# Patient Record
Sex: MALE | Race: WHITE | NOT HISPANIC OR LATINO | Employment: OTHER | URBAN - METROPOLITAN AREA
[De-identification: names, ages, dates, MRNs, and addresses within clinical notes are randomized per-mention and may not be internally consistent; named-entity substitution may affect disease eponyms.]

---

## 2018-01-16 RX ORDER — SIMVASTATIN 20 MG
20 TABLET ORAL
COMMUNITY

## 2018-01-16 RX ORDER — TAMSULOSIN HYDROCHLORIDE 0.4 MG/1
0.4 CAPSULE ORAL
COMMUNITY
End: 2018-11-15 | Stop reason: SDUPTHER

## 2018-01-16 RX ORDER — FINASTERIDE 5 MG/1
5 TABLET, FILM COATED ORAL DAILY
COMMUNITY
End: 2018-11-15 | Stop reason: SDUPTHER

## 2018-01-16 NOTE — PRE-PROCEDURE INSTRUCTIONS
Pre-Surgery Instructions:   Medication Instructions    finasteride (PROSCAR) 5 mg tablet Instructed patient per Anesthesia Guidelines   simvastatin (ZOCOR) 20 mg tablet Instructed patient per Anesthesia Guidelines   tamsulosin (FLOMAX) 0 4 mg Instructed patient per Anesthesia Guidelines  Pre op and bathing instructions given   Pt has hibiclens

## 2018-01-17 ENCOUNTER — ANESTHESIA EVENT (OUTPATIENT)
Dept: PERIOP | Facility: HOSPITAL | Age: 73
End: 2018-01-17
Payer: MEDICARE

## 2018-01-18 ENCOUNTER — ANESTHESIA (OUTPATIENT)
Dept: PERIOP | Facility: HOSPITAL | Age: 73
End: 2018-01-18
Payer: MEDICARE

## 2018-01-18 ENCOUNTER — HOSPITAL ENCOUNTER (OUTPATIENT)
Facility: HOSPITAL | Age: 73
Setting detail: OUTPATIENT SURGERY
Discharge: HOME | End: 2018-01-18
Attending: SURGERY | Admitting: SURGERY
Payer: MEDICARE

## 2018-01-18 VITALS
HEART RATE: 76 BPM | HEIGHT: 73 IN | DIASTOLIC BLOOD PRESSURE: 84 MMHG | TEMPERATURE: 97.7 F | RESPIRATION RATE: 16 BRPM | OXYGEN SATURATION: 96 % | SYSTOLIC BLOOD PRESSURE: 137 MMHG | WEIGHT: 180 LBS | BODY MASS INDEX: 23.86 KG/M2

## 2018-01-18 DIAGNOSIS — R33.8 URINARY RETENTION DUE TO BENIGN PROSTATIC HYPERPLASIA: Primary | ICD-10-CM

## 2018-01-18 DIAGNOSIS — N40.1 URINARY RETENTION DUE TO BENIGN PROSTATIC HYPERPLASIA: Primary | ICD-10-CM

## 2018-01-18 PROCEDURE — C1781 MESH (IMPLANTABLE): HCPCS | Performed by: SURGERY

## 2018-01-18 DEVICE — BARD MESH PERFIX PLUG, MEDIUM
Type: IMPLANTABLE DEVICE | Site: INGUINAL | Status: FUNCTIONAL
Brand: BARD MESH PERFIX PLUG

## 2018-01-18 DEVICE — BARD MODIFIED KUGEL HERNIA PATCH
Type: IMPLANTABLE DEVICE | Site: INGUINAL | Status: FUNCTIONAL
Brand: BARD MODIFIED KUGEL HERNIA PATCH

## 2018-01-18 RX ORDER — MEPERIDINE HYDROCHLORIDE 25 MG/ML
12.5 INJECTION INTRAMUSCULAR; INTRAVENOUS; SUBCUTANEOUS
Status: DISCONTINUED | OUTPATIENT
Start: 2018-01-18 | End: 2018-01-18 | Stop reason: HOSPADM

## 2018-01-18 RX ORDER — ONDANSETRON 2 MG/ML
INJECTION INTRAMUSCULAR; INTRAVENOUS AS NEEDED
Status: DISCONTINUED | OUTPATIENT
Start: 2018-01-18 | End: 2018-01-18 | Stop reason: SURG

## 2018-01-18 RX ORDER — LIDOCAINE HYDROCHLORIDE 10 MG/ML
INJECTION, SOLUTION INFILTRATION; PERINEURAL AS NEEDED
Status: DISCONTINUED | OUTPATIENT
Start: 2018-01-18 | End: 2018-01-18 | Stop reason: SURG

## 2018-01-18 RX ORDER — SODIUM CHLORIDE, SODIUM LACTATE, POTASSIUM CHLORIDE, CALCIUM CHLORIDE 600; 310; 30; 20 MG/100ML; MG/100ML; MG/100ML; MG/100ML
125 INJECTION, SOLUTION INTRAVENOUS CONTINUOUS
Status: DISCONTINUED | OUTPATIENT
Start: 2018-01-18 | End: 2018-01-18 | Stop reason: HOSPADM

## 2018-01-18 RX ORDER — MAGNESIUM HYDROXIDE 1200 MG/15ML
LIQUID ORAL AS NEEDED
Status: DISCONTINUED | OUTPATIENT
Start: 2018-01-18 | End: 2018-01-18 | Stop reason: HOSPADM

## 2018-01-18 RX ORDER — ONDANSETRON 2 MG/ML
4 INJECTION INTRAMUSCULAR; INTRAVENOUS EVERY 6 HOURS PRN
Status: DISCONTINUED | OUTPATIENT
Start: 2018-01-18 | End: 2018-01-18 | Stop reason: HOSPADM

## 2018-01-18 RX ORDER — SODIUM CHLORIDE 9 MG/ML
INJECTION, SOLUTION INTRAVENOUS CONTINUOUS PRN
Status: DISCONTINUED | OUTPATIENT
Start: 2018-01-18 | End: 2018-01-18 | Stop reason: SURG

## 2018-01-18 RX ORDER — METOCLOPRAMIDE HYDROCHLORIDE 5 MG/ML
5 INJECTION INTRAMUSCULAR; INTRAVENOUS ONCE AS NEEDED
Status: DISCONTINUED | OUTPATIENT
Start: 2018-01-18 | End: 2018-01-18 | Stop reason: HOSPADM

## 2018-01-18 RX ORDER — FENTANYL CITRATE/PF 50 MCG/ML
25 SYRINGE (ML) INJECTION
Status: DISCONTINUED | OUTPATIENT
Start: 2018-01-18 | End: 2018-01-18 | Stop reason: HOSPADM

## 2018-01-18 RX ORDER — ONDANSETRON 2 MG/ML
4 INJECTION INTRAMUSCULAR; INTRAVENOUS EVERY 4 HOURS PRN
Status: DISCONTINUED | OUTPATIENT
Start: 2018-01-18 | End: 2018-01-18 | Stop reason: HOSPADM

## 2018-01-18 RX ORDER — OXYCODONE HYDROCHLORIDE AND ACETAMINOPHEN 5; 325 MG/1; MG/1
2 TABLET ORAL EVERY 4 HOURS PRN
Status: DISCONTINUED | OUTPATIENT
Start: 2018-01-18 | End: 2018-01-18 | Stop reason: HOSPADM

## 2018-01-18 RX ORDER — MORPHINE SULFATE 4 MG/ML
4 INJECTION, SOLUTION INTRAMUSCULAR; INTRAVENOUS
Status: DISCONTINUED | OUTPATIENT
Start: 2018-01-18 | End: 2018-01-18 | Stop reason: HOSPADM

## 2018-01-18 RX ORDER — FENTANYL CITRATE 50 UG/ML
INJECTION, SOLUTION INTRAMUSCULAR; INTRAVENOUS AS NEEDED
Status: DISCONTINUED | OUTPATIENT
Start: 2018-01-18 | End: 2018-01-18 | Stop reason: SURG

## 2018-01-18 RX ORDER — PROPOFOL 10 MG/ML
INJECTION, EMULSION INTRAVENOUS AS NEEDED
Status: DISCONTINUED | OUTPATIENT
Start: 2018-01-18 | End: 2018-01-18 | Stop reason: SURG

## 2018-01-18 RX ORDER — SODIUM CHLORIDE 9 MG/ML
20 INJECTION, SOLUTION INTRAVENOUS CONTINUOUS
Status: DISCONTINUED | OUTPATIENT
Start: 2018-01-18 | End: 2018-01-18 | Stop reason: HOSPADM

## 2018-01-18 RX ORDER — BUPIVACAINE HYDROCHLORIDE AND EPINEPHRINE 2.5; 5 MG/ML; UG/ML
INJECTION, SOLUTION EPIDURAL; INFILTRATION; INTRACAUDAL; PERINEURAL AS NEEDED
Status: DISCONTINUED | OUTPATIENT
Start: 2018-01-18 | End: 2018-01-18 | Stop reason: HOSPADM

## 2018-01-18 RX ORDER — OXYCODONE HYDROCHLORIDE AND ACETAMINOPHEN 5; 325 MG/1; MG/1
2 TABLET ORAL EVERY 4 HOURS PRN
Qty: 28 TABLET | Refills: 0 | Status: SHIPPED | OUTPATIENT
Start: 2018-01-18

## 2018-01-18 RX ADMIN — CEFAZOLIN SODIUM 2000 MG: 2 SOLUTION INTRAVENOUS at 09:01

## 2018-01-18 RX ADMIN — FENTANYL CITRATE 25 MCG: 50 INJECTION INTRAMUSCULAR; INTRAVENOUS at 09:25

## 2018-01-18 RX ADMIN — DEXAMETHASONE SODIUM PHOSPHATE 10 MG: 10 INJECTION INTRAMUSCULAR; INTRAVENOUS at 09:03

## 2018-01-18 RX ADMIN — PROPOFOL 150 MG: 10 INJECTION, EMULSION INTRAVENOUS at 08:58

## 2018-01-18 RX ADMIN — SODIUM CHLORIDE: 0.9 INJECTION, SOLUTION INTRAVENOUS at 08:31

## 2018-01-18 RX ADMIN — LIDOCAINE HYDROCHLORIDE 50 MG: 10 INJECTION, SOLUTION INFILTRATION; PERINEURAL at 08:58

## 2018-01-18 RX ADMIN — FENTANYL CITRATE 25 MCG: 50 INJECTION INTRAMUSCULAR; INTRAVENOUS at 09:09

## 2018-01-18 RX ADMIN — ONDANSETRON 4 MG: 2 INJECTION INTRAMUSCULAR; INTRAVENOUS at 09:34

## 2018-01-18 NOTE — OP NOTE
OPERATIVE REPORT  PATIENT NAME: Tata Chacon    :  1945  MRN: [id number]  Pt Location: [facility] 02    SURGERY DATE: 2018    Surgeon(s) and Role:     * Jm Tse DO - Primary     * Deacon Roth MD - Assisting    Preop Diagnosis:  Inguinal hernia [K40 90]    Post-Op Diagnosis Codes:     * Inguinal hernia [K40 90]    Procedure:  Repair of right inguinal hernia with 4 inch modified Kugel mesh as well as medium-sized PerFix plug      Specimen(s):  * No specimens in log *    Estimated Blood Loss:   Minimal    Drains:       Anesthesia Type:   General    Operative Indications:  Inguinal hernia [K40 90]      Operative Findings:  Large indirect inguinal hernia repaired with a 4 inch modified Kugel mesh     He had a small focal direct inguinal hernia corrected with a medium plug  A single onlay mesh from the Kugel composite systems covered both areas    Review of Systems/Medical History      Cardiovascular  Hyperlipidemia,  Pulmonary   GI/Hepatic    Endo/Other GYN   Hematology Musculoskeletal   Neurology Psychology         Height 73 inches weight 82 kg/180 lb BMI 24    ASA 2, wound class 1    Due to history of urinary retention for other procedures and known BPH, a Beltre catheter was inserted for the case  He will go home with this  Complications:   None    Procedure and Technique:  Patient was brought the operative suite and identified by visualization, conversation, by armband  Sequential compression pumps were placed  He was given perioperative antibiotics  Once under anesthesia, a Beltre catheter was inserted under sterile technique  Lower abdomen and right groin were then prepped and draped in a sterile fashion  Time-out was performed and was assured that the prep was dry    Local was instilled over the right inguinal canal   Oblique skin incision was made in the subcutaneous tissues were divided with hot cautery down to the external oblique fascia this fascia was opened up through the external ring to the level of the internal ring  Pablo retractor was placed for exposure  Ilioinguinal nerve ran across the surrounding cord and therefore was sacrificed in the typical fashion with Metzenbaum scissors  Cremasteric fibers were then skeletonized off the cord structures  Was a fair amount of scarring noted of the cremasteric 6 on top of the cord at the level of the external ring  Cord structures were then encircled with a Penrose drain  Rather large indirect hernia sac was noted and this was dissected out in a high fashion preserving all cord structures  This was reduced into the internal ring defect  Preperitoneal space was developed  A 4 inch modified Kugel mesh was chosen  Round preperitoneal aspect was placed into the indirect defect  The tails were anchored each side the defect with 2 0 Vicryl suture  Tails were trimmed  I then looked inferiorly towards the pubic tubercle all to place the onlay mesh  It was a small focal direct inguinal hernia just at the pubic tubercle  Transversalis tissues were scored and the preperitoneal space was developed with a 4 x 4 sponge  A medium plug was placed into this defect  In the leads were anchored each side of the opening with 2 0 Vicryl suture  Onlay mesh from the modified cool mesh was then placed on the floor of the canal this covered both defects nicely  Two Vicryl was used anchored to the pubic tubercle shelving edge and conjoined tendon  I cut from the superior aspect of the mesh down to the cord structures  The resultant 2 tails were then brought around the cord and anchored to themselves as well as the underlying transversalis tissues creating new internal ring  The tails were trimmed slightly and then the remainder tucked under the external oblique fascia  Irrigation is carried out more local was instilled external oblique fascia was reapproximated on top of the cord with a running 2 0 Vicryl suture    Irrigation was carried out yet again  Familia was reapproximated in 2 0 Vicryl  Skin was closing 4 Monocryl in a subcuticular fashion  Wounds washed and dried  Sterile histoacryl was applied  Was assured that the testicles in the scrotum at the completion the case  He was awakened in the operating room and returned to the recovery area in stable condition   Beltre catheter remained in place to was history of urinary retention   I was present for the entire procedure    Patient Disposition:  PACU     SIGNATURE: Jm Tse DO  DATE: January 18, 2018  TIME: 9:54 AM

## 2018-01-18 NOTE — ANESTHESIA PREPROCEDURE EVALUATION
Review of Systems/Medical History          Cardiovascular  Hyperlipidemia,    Pulmonary       GI/Hepatic            Endo/Other     GYN       Hematology   Musculoskeletal       Neurology   Psychology           Physical Exam    Airway    Mallampati score: II         Dental   No notable dental hx     Cardiovascular      Pulmonary      Other Findings        Anesthesia Plan  ASA Score- 2     Anesthesia Type- general with ASA Monitors  Additional Monitors:   Airway Plan: LMA  Comment: I, Dr Kendra Adames, the attending physician, have personally seen and evaluated the patient prior to anesthetic care  I have reviewed the pre-anesthetic record, and other medical records if appropriate to the anesthetic care  If a CRNA is involved in the case, I have reviewed the CRNA assessment, if present, and agree  The patient is in a suitable condition to proceed with my formulated anesthetic plan        Plan Factors-    Induction- intravenous  Postoperative Plan-     Informed Consent- Anesthetic plan and risks discussed with patient  I personally reviewed this patient with the CRNA  Discussed and agreed on the Anesthesia Plan with the CRNA  Martín Stafford

## 2018-01-18 NOTE — DISCHARGE INSTRUCTIONS
Please call the office when you leave to schedule an appointment to be seen in 2-3 weeks    Activity:    Do not lift more than 10 pounds (a gallon of milk) for 1-2 weeks post-operatively                 Walking is encouraged  Normal daily activities including climbing steps are okay  Do not engage in strenuous activity or contact sports for 4-6 weeks post-operatively    Return to work:   Return to work to be discussed at first post-operative visit    Diet:   You may return to your normal heart healthy diet    Wound Care: Your wound is closed with histoacryl  It is okay to shower  Wash incision gently with soap and water and pat dry  Do not soak incisions in bath water or swim for two weeks  Do not apply any creams or ointments  May apply ice to wound    Pain Medication:   Please take as directed  No driving while taking narcotic pain medications    Other:  If you have questions after discharge please call the office  Beltre catheter care    Can removed on Monday 01/22/2018    If you have increased pain, fever >101 5, increased drainage, redness or a bad smell at your surgery site, please call us immediately or come directly to the Emergency Room

## 2018-01-18 NOTE — SOCIAL WORK
CM  was consulted by same day surgery to set up VNA for Pt to have Beltre cath  discontinued  CM spoke with Neon Felix from [facility] ([phone]) to set up VNA for pt once d/c    CM informed Nathan Meredith from Same day surgery to fax over needed information to ([phone])  VNA will follow up Saturday  NO other needs

## 2018-01-18 NOTE — ANESTHESIA POSTPROCEDURE EVALUATION
Post-Op Assessment Note      CV Status:  Stable    Mental Status:  Alert and lethargic    Hydration Status:  Euvolemic    PONV Controlled:  Controlled    Airway Patency:  Patent    Post Op Vitals Reviewed: Yes          Staff: CRNA           BP (P) 130/68 (01/18/18 1007)    Temp (P) 97 5 °F (36 4 °C) (01/18/18 1007)    Pulse (P) 74 (01/18/18 1007)   Resp (P) 14 (01/18/18 1007)    SpO2

## 2018-03-13 ENCOUNTER — TELEPHONE (OUTPATIENT)
Dept: UROLOGY | Facility: CLINIC | Age: 73
End: 2018-03-13

## 2018-03-13 NOTE — TELEPHONE ENCOUNTER
Appt set up for 3/19 in [place] off with Dr Liu Breeding   Patient will bring records from previous Urologist

## 2018-03-19 ENCOUNTER — OFFICE VISIT (OUTPATIENT)
Dept: UROLOGY | Facility: CLINIC | Age: 73
End: 2018-03-19
Payer: MEDICARE

## 2018-03-19 VITALS
DIASTOLIC BLOOD PRESSURE: 80 MMHG | WEIGHT: 179 LBS | SYSTOLIC BLOOD PRESSURE: 142 MMHG | HEART RATE: 96 BPM | HEIGHT: 72 IN | BODY MASS INDEX: 24.24 KG/M2

## 2018-03-19 DIAGNOSIS — N40.2 PROSTATE NODULE: ICD-10-CM

## 2018-03-19 DIAGNOSIS — N40.1 BENIGN PROSTATIC HYPERPLASIA WITH LOWER URINARY TRACT SYMPTOMS, SYMPTOM DETAILS UNSPECIFIED: Primary | ICD-10-CM

## 2018-03-19 DIAGNOSIS — N30.00 ACUTE CYSTITIS WITHOUT HEMATURIA: ICD-10-CM

## 2018-03-19 DIAGNOSIS — R97.20 ELEVATED PSA: ICD-10-CM

## 2018-03-19 LAB
BACTERIA UR QL AUTO: ABNORMAL /HPF
BILIRUB UR QL STRIP: NEGATIVE
CLARITY UR: CLEAR
COLOR UR: YELLOW
GLUCOSE UR STRIP-MCNC: NEGATIVE MG/DL
HGB UR QL STRIP.AUTO: ABNORMAL
HYALINE CASTS #/AREA URNS LPF: ABNORMAL /LPF
KETONES UR STRIP-MCNC: NEGATIVE MG/DL
LEUKOCYTE ESTERASE UR QL STRIP: ABNORMAL
NITRITE UR QL STRIP: POSITIVE
NON-SQ EPI CELLS URNS QL MICRO: ABNORMAL /HPF
PH UR STRIP.AUTO: 7 [PH] (ref 4.5–8)
PROT UR STRIP-MCNC: ABNORMAL MG/DL
RBC #/AREA URNS AUTO: ABNORMAL /HPF
SL AMB  POCT GLUCOSE, UA: ABNORMAL
SL AMB LEUKOCYTE ESTERASE,UA: ABNORMAL
SL AMB POCT BLOOD,UA: ABNORMAL
SL AMB POCT CLARITY,UA: ABNORMAL
SL AMB POCT COLOR,UA: YELLOW
SL AMB POCT KETONES,UA: ABNORMAL
SL AMB POCT NITRITE,UA: ABNORMAL
SL AMB POCT PH,UA: 5
SL AMB POCT SPECIFIC GRAVITY,UA: 1.02
SL AMB POCT URINE PROTEIN: ABNORMAL
SL AMB POCT UROBILINOGEN: ABNORMAL
SP GR UR STRIP.AUTO: 1.02 (ref 1–1.03)
UROBILINOGEN UR QL STRIP.AUTO: 0.2 E.U./DL
WBC #/AREA URNS AUTO: ABNORMAL /HPF

## 2018-03-19 PROCEDURE — 87086 URINE CULTURE/COLONY COUNT: CPT | Performed by: UROLOGY

## 2018-03-19 PROCEDURE — 81001 URINALYSIS AUTO W/SCOPE: CPT | Performed by: UROLOGY

## 2018-03-19 PROCEDURE — 99204 OFFICE O/P NEW MOD 45 MIN: CPT | Performed by: UROLOGY

## 2018-03-19 PROCEDURE — 87147 CULTURE TYPE IMMUNOLOGIC: CPT | Performed by: UROLOGY

## 2018-03-19 PROCEDURE — 81002 URINALYSIS NONAUTO W/O SCOPE: CPT | Performed by: UROLOGY

## 2018-03-19 PROCEDURE — 87186 SC STD MICRODIL/AGAR DIL: CPT | Performed by: UROLOGY

## 2018-03-19 PROCEDURE — 51798 US URINE CAPACITY MEASURE: CPT | Performed by: UROLOGY

## 2018-03-19 NOTE — PROGRESS NOTES
UROLOGY NEW CONSULT NOTE     History of Present Illness:   Laith Bearden is a 71 y o  male with a complaint of BPH/elevated PSA  Patient was previously under the care of a outside urologist   He is seen them for many years  His BPH has been managed with Flomax and Proscar  Patient had a recent cystourethroscopy in October of 2015  This demonstrated bilobar hypertrophy of the prostate with some trabeculation of the bladder  The patient has had urinary retention in the past     The patient has had markedly elevated PSAs  In 2016, the patient's PSA was as high as 16 2  Now on Proscar the patient's PSA is 9 12 measured in January of 2018  Patient has had 2 biopsies in 2012 and 2013  His prostate was measured at 66 g  He recently reports a urinary infection which was treated with antibiotics  He presents where it with his wife for discussion  The patient does have a hearing deficit  His daughter is a cardiologist with the [employer]  AUA 14 QoL1    Past Medical History:     Past Medical History:   Diagnosis Date    Hyperlipidemia        PAST SURGICAL HISTORY:     Past Surgical History:   Procedure Laterality Date    NO PAST SURGERIES      MD REPAIR ING HERNIA,5+Y/O,REDUCIBL Right 1/18/2018    Procedure: INGUINAL HERNIA REPAIR; VELÁSQUEZ CATHETER INSERTION;  Surgeon: Ruddy Coulter DO;  Location: [facility];  Service: General       CURRENT MEDICATIONS:     Current Outpatient Prescriptions   Medication Sig Dispense Refill    finasteride (PROSCAR) 5 mg tablet Take 5 mg by mouth daily      oxyCODONE-acetaminophen (PERCOCET) 5-325 mg per tablet Take 2 tablets by mouth every 4 (four) hours as needed for moderate pain for up to 20 doses Max Daily Amount: 12 tablets 28 tablet 0    simvastatin (ZOCOR) 20 mg tablet Take 20 mg by mouth daily at bedtime      tamsulosin (FLOMAX) 0 4 mg Take 0 4 mg by mouth daily with dinner       No current facility-administered medications for this visit  ALLERGIES:   No Known Allergies    SOCIAL HISTORY:     Social History     Social History    Marital status: /Civil Union     Spouse name: N/A    Number of children: N/A    Years of education: N/A     Social History Main Topics    Smoking status: Never Smoker    Smokeless tobacco: Never Used    Alcohol use Yes      Comment: occasionally    Drug use: No    Sexual activity: Not on file     Other Topics Concern    Not on file     Social History Narrative    No narrative on file       SOCIAL HISTORY:   No family history on file  REVIEW OF SYSTEMS:     General: negative for chills, fatigue, fever, significant unplanned weight changed  Psychological: negative for anxiety, depression, concentration or memory difficulties, irritability, mood swings, sleep disturbances  Ophthalmic: negative for blurry vision or double  ENT: negative for hearing difficulties, tinnitus, vertigo  Hematological and Lymphatic: negative for bleeding problems, blood clots, bruising, swollen lymph nodes  Respiratory: negative for shortness of breath, cough, hemoptysis, orthopnea, tachypnea or wheezing  Cardiovascular: negative for chest pain, dyspnea on exertion, edema, irregular or rapid heartbeat, paroxysmal nocturnal dyspnea  Gastrointestinal: negative for abdominal pain, bright red blood in stools, change in stools, constipation, diarrhea, nausea/vomiting, stool incontinence    GENITOURINARY: see HPI    Musculoskeletal: negative for gait disturbance, joint pain, joint stiffness/sweeling/pain, muscular weakness  Dermatological: negative for rash or skin lesion changes  Neurological: negative for confusion, dizziness, headaches, memory loss, numbness/tingling, seizures, speech problems, tremors or weakness    PHYSICAL EXAM:     There were no vitals taken for this visit  General:  Healthy appearing individual in no acute distress  They have a normal affect    There is not appear to be any gross neurologic defects or abnormalities  Hearing deficit  HEENT:  Normocephalic, atraumatic  Neck is supple without any palpable lymphadenopathy  Cardiovascular:  Patient has normal palpable distal radial pulses  There is no significant peripheral edema  No JVD is noted  Respiratory:  Patient has unlabored respirations  There is no audible wheeze or rhonchi  Abdomen:  Abdomen is without surgical scars  Abdomen is soft and nontender  There is no tympany  Inguinal and umbilical hernia are not appreciated  Genitourinary: no penile lesions or discharge, no testicular masses or tenderness, no hernias  YULIA with 1-1 5cm RIGHT base indurated nodule    Musculoskeletal:  Patient does not have significant CVA tenderness with palpation or percussion  They full range of motion in all 4 extremities  Strength in all 4 extremities appears congruent  Patient is able to ambulate without assistance or difficulty  Dermatologic:  Patient has no skin abnormalities or rashes  LABS:     CBC: No results found for: WBC, HGB, HCT, MCV, PLT    BMP: No results found for: GLUCOSE, CALCIUM, NA, K, CO2, CL, BUN, CREATININE    IMAGING:     No  imaging    PATHOLOGY:     Reported ASAP on biopsy in 2012; negative biopsy in 2013    PROCEDURE:     PVR 0cc    ASSESSMENT:     71 y o  male with markedly elevated PSA on Proscar, prostate nodule, and 2 prior negative biopsies  PLAN:     The patient is stable from a urinary perspective in terms of his symptoms  He is emptying his bladder well today on postvoid residual and has a low AUA symptom score  We discussed that if his symptoms worsen, we could consider doubling his Flomax but at this point time the patient is stable and comfortable  I have discussed at length the findings of the prostate nodule on his exam as well as the PSA of 9 which we have to double given his use of Proscar medication  His last biopsy was approximately 5 years ago and there is a report of a prior atypical biopsy    I do have some level of concern that there is an occult prostate cancer present  I have recommended a multiparametric MRI of the patient's pelvis and prostate to evaluate for a lesion of concern  I have discussed with the patient and his wife and his daughter by phone the likelihood of needing a 3rd prostate biopsy pending the results of the MRI  Given the patient's age and general health, I think it is reasonable if he had an intermediate or high risk disease to recommend active treatment  Patient and his family are comfortable with the plan moving forward  They will obtain the MRI and I will plan to see them back in short order  They will call me with any questions or concerns in the interim  Patient will continue on daily Flomax and Proscar for the time being

## 2018-03-19 NOTE — PATIENT INSTRUCTIONS
Decision Aid for Clinically Localized Prostate Cancer   AMBULATORY CARE:   What you need to know about decisions for clinically localized prostate cancer: You can work with your healthcare provider to make decisions about being screened or treated for prostate cancer  Screening is a test done to find prostate cancer early  Screening is different from diagnosis because screening is used before you first start to have signs or symptoms  This means management or treatment can start early  You can also help plan treatment if cancer is found with screening, or you develop it later on  What you need to know about clinically localized prostate cancer:   · The prostate is a small gland that is part of the reproductive system  It sits around your urethra (tube that carries urine out of your bladder)  Cancer cells start to grow inside the prostate gland  The cells form a mass that continues to grow if left untreated  Clinically localized means that the cancer has not moved beyond the prostate gland  · Prostate cancer is the second most common form of cancer in men (skin cancer is most common)  About 17% of men in the [place] develop prostate cancer  About 3% of men in the [place] die from prostate cancer  · Prostate cancer often grows slowly  Sometimes the tumor does not grow at all  The cancer may not grow quickly enough to be life-threatening in your lifetime  · The risk for prostate cancer increases with age  Your risk is highest if you are older than 65 years  Your risk is lowest if you are younger than 45 years  Your risk is also increased if you have a history of prostate cancer in your father, brother, or son  · You may have no signs or symptoms of prostate cancer until the tumor grows large  You may have trouble urinating or keeping a strong urine stream because of the tumor  At later stages, prostate cancer can spread to your bones or lymph nodes  You may have bone pain or fractures    How to know if you are a good candidate for prostate cancer screening:  Screening may be helpful for you if any of the following is true:  · You are 72 years or older  · You have a family history of prostate cancer or other prostate problems  · You do not have another health condition that may prevent you from living longer than another 10 years  · You want to have treatment as early as possible if needed  · You are willing to have screening as often as recommended by your healthcare provider  How screening is done:   · A digital rectal exam  is used to check your prostate  Your healthcare provider will insert a gloved finger into your rectum  The provider will be able to feel your prostate for lumps or hard areas that could be tumors  The exam may be repeated over time to check for new or worsening problems  · A PSA test  is used to measure the amount of a protein made by your prostate gland  A blood sample is taken for this test  A high PSA level may be a sign of prostate cancer  Benefits and risks of screening:  Talk with your healthcare provider about the risks and benefits of screening:  · Benefits  include finding prostate cancer early  Cancer treatment is often more successful when it starts early  This means you can make more decisions about treatment  · Risks  include the following:     ¨ You may have a false belief that you will not develop prostate cancer if your screening result is negative  You can still develop prostate cancer later on  ¨ A PSA test can give a false-negative result  This means the result looks like you do not have cancer even though you do  Treatment or monitoring may be delayed because the tests suggested you do not have cancer  ¨ The PSA test can also give a false-positive result  This means you do not actually have cancer but the test shows you do  You may get more tests, a biopsy, or even treatments that are not needed   It can also be stressful to think you have prostate cancer when you do not  Questions to ask your healthcare provider to help you make decisions about screening:   · How high is my risk for prostate cancer? · How often do I need to have screening? · Where is the screening done? · Do I need to do anything to get ready to have screening? What happens after you have screening:   · You will meet with your healthcare provider to go over the results of your screening  · You may need more tests to diagnose anything that showed up on the screening test  Only a biopsy (tissue sample) can show for sure that you have prostate cancer  · After cancer is found, your healthcare provider will assign a number called a Suzanne score  The number can help you understand how quickly the cancer is likely to grow, and if it may spread:     ¨ A number of 6 or lower means the cancer is likely to grow more slowly  ¨ A score of 7 means it is likely to grow faster, but it may not spread to other areas  ¨ A score of 8 to 10 means it is likely to grow more quickly and also spread  · Your healthcare provider will also assign a T stage to the tumor  This number shows the growth of the tumor and if it is likely to spread to other areas  · You and your healthcare provider will use the Suzanne score, T stage, and PSA results to talk about your treatment options  Your provider will tell you if your prostate cancer is at low, medium, or high risk for growing and spreading  The need for more tests and the range of treatment options depend on the risk level  Together you and your provider can create a treatment plan that is right for you  How clinically localized prostate cancer is treated, and the benefits of treatment:   · Watchful waiting  means you do not receive treatment right away  If the cancer does not grow or spread, you may never need treatment  Watchful waiting may be used if your tumor risk is low   The benefit of this option is that you will not have invasive or difficult treatment  You can choose a different treatment option if tests show the tumor is growing or spreading over time  · Active surveillance  also means you do not receive treatment right away  You will need to have tests over time to continue to check the tumor  A benefit of this option is that follow-up tests can show a change early  Treatment options may be less invasive than if the tumor is found at a later stage  · Cryoablation  is used to kill cancer cells by freezing them  This is a less invasive treatment  You may have little or no pain after this procedure  · Radiation  may be used to destroy the cancer cells  Radiation is about as effective as surgery to remove the prostate gland  This treatment leaves the prostate in place and only targets the cancer cells  · Surgery  is used to remove the prostate gland  The tumor is in the gland, so it will be removed along with the gland  · Hormone therapy  may be added to surgery or radiation treatment  Your testosterone level is lowered, or it is blocked  This can stop the cancer cells from growing, or slow the growth  Hormone therapy may be given as an injection every 1 to 6 months  Another way to lower your testosterone level is to have surgery to remove your testicles  Your body will no longer make testosterone if your testicles are removed  Risks of treatment:   · Watchful waiting and active surveillance  can cause you to worry that the cancer is growing or spreading  · Surgery  can damage tissue around your prostate  Surgery can increase your risk for trouble urinating, incontinence (leaking), or urinary retention  Surgery can also cause problems with your ability to have or keep an erection  You may bleed more than expected during surgery or develop an infection  You may also need to be treated again if the surgery you have does not relieve your symptoms  Surgery may not be able to remove all the tumor cells      · Radiation  may not kill all the cancer cells  Radiation can increase your risk for trouble urinating, incontinence (leaking), or urinary retention  You may have temporary or permanent hair loss in your scrotum  Your skin can become dry or irritated  You may develop problems urinating or having a bowel movement  Radiation can also cause problems with your ability to have or keep an erection  · Cryoablation  may not kill all the cancer cells  You may develop scar tissue  You can also have swelling, problems urinating, or pain in your pelvis or scrotum  Tissue outside the prostate may be damaged during cryoablation  You may have permanent erection problems  Questions to ask your healthcare provider to help you make decisions about treatment:   · What is my Byfield score, T score, and risk number? · How often will I need follow-up tests if I choose active surveillance first?    · How will each treatment option affect my daily activities? · What is the risk for erectile dysfunction or impotence with each treatment? · Am I a good candidate for surgery? · Which surgery may work best to treat my symptoms? · Where is the surgery done? · How long is recovery after surgery? · Will my insurance cover treatment? Questions to consider to help you make decisions about treatment:   · If my cancer risk is low, will I be comfortable with watchful waiting or active surveillance instead of immediate treatment? How will I feel if the cancer grows or spreads? · Will I go in for follow-up tests over time if I do not want treatment right away? · If I have treatment and lose my ability to have an erection, how will I feel? · Am I willing to accept problems with urinating or having a bowel movement that might happen with treatment? · How will my family or others in my life be affected by my treatment decisions?   © 2017 [employer] Information is for End User's use only and may not be sold, redistributed or otherwise used for commercial purposes  All illustrations and images included in CareNotes® are the copyrighted property of A [employer] M [employer] Inc  or [employer]  The above information is an  only  It is not intended as medical advice for individual conditions or treatments  Talk to your doctor, nurse or pharmacist before following any medical regimen to see if it is safe and effective for you

## 2018-03-21 LAB — BACTERIA UR CULT: ABNORMAL

## 2018-03-22 RX ORDER — SULFAMETHOXAZOLE AND TRIMETHOPRIM 800; 160 MG/1; MG/1
1 TABLET ORAL EVERY 12 HOURS SCHEDULED
Qty: 10 TABLET | Refills: 0 | Status: SHIPPED | OUTPATIENT
Start: 2018-03-22 | End: 2018-03-27

## 2018-04-05 ENCOUNTER — APPOINTMENT (OUTPATIENT)
Dept: LAB | Facility: HOSPITAL | Age: 73
End: 2018-04-05
Attending: UROLOGY
Payer: MEDICARE

## 2018-04-05 LAB
ANION GAP SERPL CALCULATED.3IONS-SCNC: 7 MMOL/L (ref 4–13)
BUN SERPL-MCNC: 16 MG/DL (ref 5–25)
CALCIUM SERPL-MCNC: 9.7 MG/DL (ref 8.3–10.1)
CHLORIDE SERPL-SCNC: 106 MMOL/L (ref 100–108)
CO2 SERPL-SCNC: 30 MMOL/L (ref 21–32)
CREAT SERPL-MCNC: 0.93 MG/DL (ref 0.6–1.3)
GFR SERPL CREATININE-BSD FRML MDRD: 82 ML/MIN/1.73SQ M
GLUCOSE SERPL-MCNC: 103 MG/DL (ref 65–140)
POTASSIUM SERPL-SCNC: 4.2 MMOL/L (ref 3.5–5.3)
SODIUM SERPL-SCNC: 143 MMOL/L (ref 136–145)

## 2018-04-05 PROCEDURE — 36415 COLL VENOUS BLD VENIPUNCTURE: CPT | Performed by: UROLOGY

## 2018-04-05 PROCEDURE — 80048 BASIC METABOLIC PNL TOTAL CA: CPT | Performed by: UROLOGY

## 2018-04-09 ENCOUNTER — HOSPITAL ENCOUNTER (OUTPATIENT)
Dept: MRI IMAGING | Facility: HOSPITAL | Age: 73
Discharge: HOME | End: 2018-04-09
Attending: UROLOGY
Payer: MEDICARE

## 2018-04-09 DIAGNOSIS — R97.20 ELEVATED PSA: ICD-10-CM

## 2018-04-09 DIAGNOSIS — N40.2 PROSTATE NODULE: ICD-10-CM

## 2018-04-09 DIAGNOSIS — N40.1 BENIGN PROSTATIC HYPERPLASIA WITH LOWER URINARY TRACT SYMPTOMS, SYMPTOM DETAILS UNSPECIFIED: ICD-10-CM

## 2018-04-09 PROCEDURE — A9577 INJ MULTIHANCE: HCPCS | Performed by: UROLOGY

## 2018-04-09 PROCEDURE — 76377 3D RENDER W/INTRP POSTPROCES: CPT

## 2018-04-09 PROCEDURE — 72197 MRI PELVIS W/O & W/DYE: CPT

## 2018-04-09 RX ADMIN — GADOBENATE DIMEGLUMINE 14 ML: 529 INJECTION, SOLUTION INTRAVENOUS at 13:58

## 2018-05-01 ENCOUNTER — OFFICE VISIT (OUTPATIENT)
Dept: UROLOGY | Facility: CLINIC | Age: 73
End: 2018-05-01
Payer: MEDICARE

## 2018-05-01 VITALS
SYSTOLIC BLOOD PRESSURE: 150 MMHG | DIASTOLIC BLOOD PRESSURE: 80 MMHG | BODY MASS INDEX: 24.11 KG/M2 | HEIGHT: 72 IN | WEIGHT: 178 LBS | HEART RATE: 96 BPM | RESPIRATION RATE: 20 BRPM

## 2018-05-01 DIAGNOSIS — N40.2 PROSTATE NODULE: Primary | ICD-10-CM

## 2018-05-01 DIAGNOSIS — R97.20 ELEVATED PSA: ICD-10-CM

## 2018-05-01 LAB
SL AMB  POCT GLUCOSE, UA: ABNORMAL
SL AMB LEUKOCYTE ESTERASE,UA: ABNORMAL
SL AMB POCT BILIRUBIN,UA: ABNORMAL
SL AMB POCT BLOOD,UA: ABNORMAL
SL AMB POCT CLARITY,UA: CLEAR
SL AMB POCT COLOR,UA: YELLOW
SL AMB POCT KETONES,UA: ABNORMAL
SL AMB POCT NITRITE,UA: ABNORMAL
SL AMB POCT PH,UA: 6
SL AMB POCT SPECIFIC GRAVITY,UA: 1.02
SL AMB POCT URINE PROTEIN: ABNORMAL
SL AMB POCT UROBILINOGEN: ABNORMAL

## 2018-05-01 PROCEDURE — 99213 OFFICE O/P EST LOW 20 MIN: CPT | Performed by: UROLOGY

## 2018-05-01 PROCEDURE — 81002 URINALYSIS NONAUTO W/O SCOPE: CPT | Performed by: UROLOGY

## 2018-05-01 NOTE — PROGRESS NOTES
UROLOGY ROUTINE FOLLOW UP NOTE     History of Present Illness:   Federico Weiss is a 71 y o  male with a complaint of BPH/elevated PSA  Patient was previously under the care of a outside urologist   He is seen them for many years  His BPH has been managed with Flomax and Proscar  Patient had a recent cystourethroscopy in October of 2015  This demonstrated bilobar hypertrophy of the prostate with some trabeculation of the bladder  The patient has had urinary retention in the past     The patient has had markedly elevated PSAs  In 2016, the patient's PSA was as high as 16 2  Now on Proscar the patient's PSA is 9 12 measured in January of 2018  Patient has had 2 biopsies in 2012 and 2013  His prostate was measured at 66 g  He recently reports a urinary infection which was treated with antibiotics  He presents where it with his wife for discussion  The patient does have a hearing deficit  His daughter is a cardiologist with the [employer] system      AUA 14 QoL1    Returns to discuss his options and results of mpMRI    Past Medical History:     Past Medical History:   Diagnosis Date    BPH with obstruction/lower urinary tract symptoms     Elevated PSA     Hyperlipidemia        PAST SURGICAL HISTORY:     Past Surgical History:   Procedure Laterality Date    HERNIA REPAIR      NO PAST SURGERIES      KS REPAIR ING HERNIA,5+Y/O,REDUCIBL Right 1/18/2018    Procedure: INGUINAL HERNIA REPAIR; VELÁSQUEZ CATHETER INSERTION;  Surgeon: Yajaira Fritz DO;  Location: [facility];  Service: General       CURRENT MEDICATIONS:     Current Outpatient Prescriptions   Medication Sig Dispense Refill    finasteride (PROSCAR) 5 mg tablet Take 5 mg by mouth daily      simvastatin (ZOCOR) 20 mg tablet Take 20 mg by mouth daily at bedtime      tamsulosin (FLOMAX) 0 4 mg Take 0 4 mg by mouth daily with dinner      oxyCODONE-acetaminophen (PERCOCET) 5-325 mg per tablet Take 2 tablets by mouth every 4 (four) hours as needed for moderate pain for up to 20 doses Max Daily Amount: 12 tablets 28 tablet 0     No current facility-administered medications for this visit          ALLERGIES:   No Known Allergies    SOCIAL HISTORY:     Social History     Social History    Marital status: /Civil Union     Spouse name: N/A    Number of children: N/A    Years of education: N/A     Social History Main Topics    Smoking status: Never Smoker    Smokeless tobacco: Never Used    Alcohol use Yes      Comment: occasionally    Drug use: No    Sexual activity: Not Asked     Other Topics Concern    None     Social History Narrative    None       SOCIAL HISTORY:     Family History   Problem Relation Age of Onset    Colon cancer Mother        REVIEW OF SYSTEMS:     General: negative for chills, fatigue, fever, significant unplanned weight changed  Psychological: negative for anxiety, depression, concentration or memory difficulties, irritability, mood swings, sleep disturbances  Ophthalmic: negative for blurry vision or double  ENT: negative for hearing difficulties, tinnitus, vertigo  Hematological and Lymphatic: negative for bleeding problems, blood clots, bruising, swollen lymph nodes  Respiratory: negative for shortness of breath, cough, hemoptysis, orthopnea, tachypnea or wheezing  Cardiovascular: negative for chest pain, dyspnea on exertion, edema, irregular or rapid heartbeat, paroxysmal nocturnal dyspnea  Gastrointestinal: negative for abdominal pain, bright red blood in stools, change in stools, constipation, diarrhea, nausea/vomiting, stool incontinence    GENITOURINARY: see HPI    Musculoskeletal: negative for gait disturbance, joint pain, joint stiffness/sweeling/pain, muscular weakness  Dermatological: negative for rash or skin lesion changes  Neurological: negative for confusion, dizziness, headaches, memory loss, numbness/tingling, seizures, speech problems, tremors or weakness    PHYSICAL EXAM:     /80   Pulse 96 Resp 20   Ht 6' (1 829 m)   Wt 80 7 kg (178 lb)   BMI 24 14 kg/m²   General:  Healthy appearing individual in no acute distress  They have a normal affect  There is not appear to be any gross neurologic defects or abnormalities  Hearing deficit  HEENT:  Normocephalic, atraumatic  Neck is supple without any palpable lymphadenopathy  Cardiovascular:  Patient has normal palpable distal radial pulses  There is no significant peripheral edema  No JVD is noted  Respiratory:  Patient has unlabored respirations  There is no audible wheeze or rhonchi  Abdomen:  Abdomen is without surgical scars  Abdomen is soft and nontender  There is no tympany  Inguinal and umbilical hernia are not appreciated  Genitourinary: no penile lesions or discharge, no testicular masses or tenderness, no hernias  YULIA with 1-1 5cm RIGHT base indurated nodule    Musculoskeletal:  Patient does not have significant CVA tenderness with palpation or percussion  They full range of motion in all 4 extremities  Strength in all 4 extremities appears congruent  Patient is able to ambulate without assistance or difficulty  Dermatologic:  Patient has no skin abnormalities or rashes       LABS:     CBC: No results found for: WBC, HGB, HCT, MCV, PLT    BMP:   Lab Results   Component Value Date    GLUCOSE 103 04/05/2018    CALCIUM 9 7 04/05/2018     04/05/2018    K 4 2 04/05/2018    CO2 30 04/05/2018     04/05/2018    BUN 16 04/05/2018    CREATININE 0 93 04/05/2018     Urine Culture >100,000 cfu/ml Staphylococcus coagulase negative           Susceptibility      Staphylococcus coagulase negative     KENDRA     Ampicillin ($$) <=2 00 ug/ml"><=2 00 ug/ml Resistant     Cefazolin ($) <=4 00 ug/ml"><=4 00 ug/ml Susceptible     Gentamicin ($$) 2 ug/ml Susceptible     Nitrofurantoin <=32 ug/ml"><=32 ug/ml Susceptible     Oxacillin <=0 25 ug/ml"><=0 25 ug/ml Susceptible     Tetracycline <=2 ug/ml"><=2 ug/ml Susceptible     Trimethoprim + Sulfamethoxazole ($$$) <=0 5/9 5 ug/ml"><=0 5/9 5 u  Susceptible     Vancomycin ($) 2 00 ug/ml Susceptible     ZID Performed Yes                Specimen Collected: 03/19/18 10:58 AM          IMAGING:     MULTIPARAMETRIC MRI OF THE PROSTATE WITH AND WITHOUT CONTRAST      INDICATION: Previous biopsy, PSA of 16 2 in 2016, 9 12 in January      COMPARISON: None     PSA LEVEL: 9 12 ng/ml  ( )  PRIOR BIOPSY DATE: Biopsy 2012, 2013  BIOPSY RESULTS: Not applicable      TECHNIQUE: The following pulse sequences were obtained on a 1 5 T scanner:  T1w axial; T2w axial, sagittal and coronal; DWI axial and ADC map; T1w water, fat, in-phase and opposed-phase axials of entire pelvis and dynamic 3D T1w axial before and during   IV contrast injection      CONTRAST:  Gadobutrol (Gadavist) 15 ml      TECHNICAL LIMITATIONS: None         FINDINGS:     PROSTATE GLAND:  -VOLUME: 63 ml   -PERIPHERAL ZONE:  Is compressed by enlarged transition zone  -TRANSITION ZONE:  Enlargement of the transitional zone with presence of stromal nodules  -CENTRAL ZONE: Normal   -ANTERIOR FIBROMUSCULAR STROMA:    -"CAPSULE": Intact      SEMINAL VESICLES:       PELVIC CAVITY:  -LYMPH NODES: No lymphadenopathy  -BLADDER: Mild thickening of the urinary bladder is well seen  -ADDITIONAL FINDINGS: No other significant findings      OSSEOUS STRUCTURES:   Normal marrow signal  No evidence of bone metastases     Surgical changes are seen in the right groin from prior hernia repair     IMPRESSION:     There is no MRI evidence of  clinically significant cancer  Enlarged prostate gland with enlarged transition zone    Mild bladder wall thickening  PI-RADS v2 Assessment Category: PI-RADS 2: Low (clinically significant cancer is unlikely to be present)      Routine surveillance can be performed    PATHOLOGY:     Reported ASAP on biopsy in 2012; negative biopsy in 2013    ASSESSMENT:     71 y o  male with markedly elevated PSA on Proscar, prostate nodule, and 2 prior negative biopsies, now with negative mpMRI    PLAN:     The patient is stable from a urinary perspective in terms of his symptoms  He will continue on the flomax/proscar regimen    With regard to the patient's PSA level which is elevated in the 9's (double to 18 due to proscar use), we could certainly consider attributing this to the patient's positive urine culture both prior to meeting me and again his asymptomatic bacteriuria in March  He was treated with antibiotics for this  Patient does have a nodule on his digital rectal exam however he has undergone a multiparametric MRI which is negative for high-grade lesions or concerns  I have had a very nice conversation with the patient and his wife detailing our options  I have offered the patient consideration of a repeat transrectal ultrasound-guided biopsy of the prostate given the elevated PSA and nodule  I also think it is entirely reasonable continue to follow  The patient has been dealing with this elevated PSA issue for the last 20 some years  He is more inclined to continue to follow  As such, I will plan to see him back in 6 months with an updated PSA  I will perform a digital rectal exam at that visit to trend the nodule felt  I am reassured by his MRI

## 2018-10-30 ENCOUNTER — TELEPHONE (OUTPATIENT)
Dept: UROLOGY | Facility: CLINIC | Age: 73
End: 2018-10-30

## 2018-10-30 NOTE — TELEPHONE ENCOUNTER
Patient's wife called requesting script for PSA  Patient has an appointment 11/15/2018 with Dr Elle Mejia  Wife requested script be faxed to her fax number [phone]  Script fax to above number and confirmation received

## 2018-11-01 ENCOUNTER — APPOINTMENT (OUTPATIENT)
Dept: LAB | Facility: HOSPITAL | Age: 73
End: 2018-11-01
Attending: UROLOGY
Payer: MEDICARE

## 2018-11-01 DIAGNOSIS — N40.2 PROSTATE NODULE: ICD-10-CM

## 2018-11-01 DIAGNOSIS — R97.20 ELEVATED PSA: ICD-10-CM

## 2018-11-01 PROCEDURE — 36415 COLL VENOUS BLD VENIPUNCTURE: CPT

## 2018-11-01 PROCEDURE — 84154 ASSAY OF PSA FREE: CPT

## 2018-11-01 PROCEDURE — 84153 ASSAY OF PSA TOTAL: CPT

## 2018-11-03 LAB
PSA FREE MFR SERPL: 19.6 %
PSA FREE SERPL-MCNC: 1.08 NG/ML
PSA SERPL-MCNC: 5.5 NG/ML (ref 0–4)

## 2018-11-15 ENCOUNTER — OFFICE VISIT (OUTPATIENT)
Dept: UROLOGY | Facility: CLINIC | Age: 73
End: 2018-11-15
Payer: MEDICARE

## 2018-11-15 VITALS
BODY MASS INDEX: 24.14 KG/M2 | DIASTOLIC BLOOD PRESSURE: 110 MMHG | SYSTOLIC BLOOD PRESSURE: 190 MMHG | HEART RATE: 88 BPM | WEIGHT: 178 LBS

## 2018-11-15 DIAGNOSIS — R97.20 ELEVATED PSA: Primary | ICD-10-CM

## 2018-11-15 DIAGNOSIS — R39.12 WEAK URINARY STREAM: ICD-10-CM

## 2018-11-15 DIAGNOSIS — N40.2 PROSTATE NODULE: ICD-10-CM

## 2018-11-15 PROCEDURE — 99213 OFFICE O/P EST LOW 20 MIN: CPT | Performed by: UROLOGY

## 2018-11-15 RX ORDER — FINASTERIDE 5 MG/1
5 TABLET, FILM COATED ORAL DAILY
Qty: 30 TABLET | Refills: 11 | Status: SHIPPED | OUTPATIENT
Start: 2018-11-15 | End: 2019-12-04 | Stop reason: SDUPTHER

## 2018-11-15 RX ORDER — TAMSULOSIN HYDROCHLORIDE 0.4 MG/1
0.4 CAPSULE ORAL
Qty: 30 CAPSULE | Refills: 11 | Status: SHIPPED | OUTPATIENT
Start: 2018-11-15 | End: 2019-12-04 | Stop reason: SDUPTHER

## 2018-11-15 NOTE — PROGRESS NOTES
UROLOGY ROUTINE FOLLOW UP NOTE     History of Present Illness:   Migue Mahmood is a 68 y o  male with a complaint of BPH/elevated PSA  Patient was previously under the care of a outside urologist   He is seen them for many years  His BPH has been managed with Flomax and Proscar  Patient had a recent cystourethroscopy in October of 2015  This demonstrated bilobar hypertrophy of the prostate with some trabeculation of the bladder  The patient has had urinary retention in the past     The patient has had markedly elevated PSAs  In 2016, the patient's PSA was as high as 16 2  Now on Proscar the patient's PSA is 9 12 measured in January of 2018  Patient has had 2 biopsies in 2012 and 2013  His prostate was measured at 66 g  He recently reports a urinary infection which was treated with antibiotics  He presents where it with his wife for discussion  The patient does have a hearing deficit  His daughter is a cardiologist with the [address] [place] [address]  AU 14 QoL 1    Patient underwent a multiparametric MRI of his prostate  He has continued on PSA screening given his prior elevation  Fortunately there is low level concern for prostate cancer      Past Medical History:     Past Medical History:   Diagnosis Date    BPH with obstruction/lower urinary tract symptoms     Elevated PSA     Hyperlipidemia        PAST SURGICAL HISTORY:     Past Surgical History:   Procedure Laterality Date    HERNIA REPAIR      NO PAST SURGERIES      [place] REPAIR ING HERNIA,5+Y/O,REDUCIBL Right 1/18/2018    Procedure: INGUINAL HERNIA REPAIR; VELÁSQUEZ CATHETER INSERTION;  Surgeon: Jez Givens DO;  [facility];  Service: General       CURRENT MEDICATIONS:     Current Outpatient Prescriptions   Medication Sig Dispense Refill    finasteride (PROSCAR) 5 mg tablet Take 5 mg by mouth daily      simvastatin (ZOCOR) 20 mg tablet Take 20 mg by mouth daily at bedtime      tamsulosin (FLOMAX) 0 4 mg Take 0 4 mg by mouth daily with dinner      oxyCODONE-acetaminophen (PERCOCET) 5-325 mg per tablet Take 2 tablets by mouth every 4 (four) hours as needed for moderate pain for up to 20 doses Max Daily Amount: 12 tablets (Patient not taking: Reported on 11/15/2018 ) 28 tablet 0     No current facility-administered medications for this visit  ALLERGIES:   No Known Allergies    SOCIAL HISTORY:     Social History     Social History    Marital status: /Civil Union     Spouse name: N/A    Number of children: N/A    Years of education: N/A     Occupational History       retired      Social History Main Topics    Smoking status: Never Smoker    Smokeless tobacco: Never Used    Alcohol use Yes      Comment: occasionally    Drug use: No    Sexual activity: Not Asked     Other Topics Concern    None     Social History Narrative    None       SOCIAL HISTORY:     Family History   Problem Relation Age of Onset    Colon cancer Mother        REVIEW OF SYSTEMS:   Review of Systems   Constitutional: Negative  Respiratory: Negative  Cardiovascular: Negative  Gastrointestinal: Negative  Genitourinary: Positive for difficulty urinating (Weak stream)  Neurological: Negative  Psychiatric/Behavioral: Negative  PHYSICAL EXAM:     BP (!) 190/110   Pulse 88   Wt 80 7 kg (178 lb)   BMI 24 14 kg/m²   General:  Healthy appearing individual in no acute distress  They have a normal affect  There is not appear to be any gross neurologic defects or abnormalities  Hearing deficit  HEENT:  Normocephalic, atraumatic  Neck is supple without any palpable lymphadenopathy  Cardiovascular:  Patient has normal palpable distal radial pulses  There is no significant peripheral edema  No JVD is noted  Respiratory:  Patient has unlabored respirations  There is no audible wheeze or rhonchi  Abdomen:  Abdomen is without surgical scars  Abdomen is soft and nontender  There is no tympany    Inguinal and umbilical hernia are not appreciated  Genitourinary: no penile lesions or discharge, no testicular masses or tenderness, no hernias  YULIA with 1-1 5cm RIGHT base indurated nodule unchanged from prior digital rectal exam    Musculoskeletal:  Patient does not have significant CVA tenderness with palpation or percussion  They full range of motion in all 4 extremities  Strength in all 4 extremities appears congruent  Patient is able to ambulate without assistance or difficulty  Dermatologic:  Patient has no skin abnormalities or rashes  LABS:     CBC: No results found for: WBC, HGB, HCT, MCV, PLT    BMP:   Lab Results   Component Value Date    CALCIUM 9 7 04/05/2018    K 4 2 04/05/2018    CO2 30 04/05/2018     04/05/2018    BUN 16 04/05/2018    CREATININE 0 93 04/05/2018     Lab Results   Component Value Date    PSA 5 5 (H) 11/01/2018     IMAGING:     MULTIPARAMETRIC MRI OF THE PROSTATE WITH AND WITHOUT CONTRAST      INDICATION: Previous biopsy, PSA of 16 2 in 2016, 9 12 in January      COMPARISON: None     PSA LEVEL: 9 12 ng/ml  ( )  PRIOR BIOPSY DATE: Biopsy 2012, 2013  BIOPSY RESULTS: Not applicable      TECHNIQUE: The following pulse sequences were obtained on a 1 5 T scanner:  T1w axial; T2w axial, sagittal and coronal; DWI axial and ADC map; T1w water, fat, in-phase and opposed-phase axials of entire pelvis and dynamic 3D T1w axial before and during   IV contrast injection      CONTRAST:  Gadobutrol (Gadavist) 15 ml      TECHNICAL LIMITATIONS: None         FINDINGS:     PROSTATE GLAND:  -VOLUME: 63 ml   -PERIPHERAL ZONE:  Is compressed by enlarged transition zone  -TRANSITION ZONE:  Enlargement of the transitional zone with presence of stromal nodules  -CENTRAL ZONE: Normal   -ANTERIOR FIBROMUSCULAR STROMA:    -"CAPSULE": Intact      SEMINAL VESICLES:       PELVIC CAVITY:  -LYMPH NODES: No lymphadenopathy  -BLADDER: Mild thickening of the urinary bladder is well seen    -ADDITIONAL FINDINGS: No other significant findings      OSSEOUS STRUCTURES:   Normal marrow signal  No evidence of bone metastases     Surgical changes are seen in the right groin from prior hernia repair     IMPRESSION:     There is no MRI evidence of  clinically significant cancer  Enlarged prostate gland with enlarged transition zone  Mild bladder wall thickening  PI-RADS v2 Assessment Category: PI-RADS 2: Low (clinically significant cancer is unlikely to be present)      Routine surveillance can be performed    PATHOLOGY:     Reported ASAP on biopsy in 2012; negative biopsy in 2013    ASSESSMENT:     68 y o  male with elevated PSA on Proscar continuing to improve, prostate nodule, and 2 prior negative biopsies, now with negative mpMRI    PLAN:     The patient is stable from a urinary perspective in terms of his symptoms  He will continue on the flomax/proscar regimen    Patient will continue to prefer followup of PSA value as opposed to any further aggressive biopsy  We are reassured by PSA trend and mpMRI  F/U one year

## 2019-11-12 ENCOUNTER — TELEPHONE (OUTPATIENT)
Dept: UROLOGY | Facility: MEDICAL CENTER | Age: 74
End: 2019-11-12

## 2019-11-12 DIAGNOSIS — R97.20 ELEVATED PSA: Primary | ICD-10-CM

## 2019-11-12 NOTE — TELEPHONE ENCOUNTER
Pt managed by Breana Alexandre had scheduled 12/09/19 appointment with psa will need new order entered into epic as present order expires 11/15/19 he is not having done [facility] until first week Dec

## 2019-11-18 ENCOUNTER — APPOINTMENT (OUTPATIENT)
Dept: LAB | Facility: HOSPITAL | Age: 74
End: 2019-11-18
Attending: UROLOGY
Payer: MEDICARE

## 2019-11-18 DIAGNOSIS — R97.20 ELEVATED PSA: ICD-10-CM

## 2019-11-18 LAB — PSA SERPL-MCNC: 5.2 NG/ML (ref 0–4)

## 2019-11-18 PROCEDURE — 84153 ASSAY OF PSA TOTAL: CPT

## 2019-12-04 DIAGNOSIS — R39.12 WEAK URINARY STREAM: ICD-10-CM

## 2019-12-04 RX ORDER — FINASTERIDE 5 MG/1
TABLET, FILM COATED ORAL
Qty: 30 TABLET | Refills: 11 | Status: SHIPPED | OUTPATIENT
Start: 2019-12-04 | End: 2020-11-27

## 2019-12-04 RX ORDER — TAMSULOSIN HYDROCHLORIDE 0.4 MG/1
CAPSULE ORAL
Qty: 30 CAPSULE | Refills: 11 | Status: SHIPPED | OUTPATIENT
Start: 2019-12-04 | End: 2020-11-27

## 2020-01-14 ENCOUNTER — OFFICE VISIT (OUTPATIENT)
Dept: UROLOGY | Facility: CLINIC | Age: 75
End: 2020-01-14
Payer: MEDICARE

## 2020-01-14 VITALS
HEART RATE: 100 BPM | DIASTOLIC BLOOD PRESSURE: 82 MMHG | BODY MASS INDEX: 24.65 KG/M2 | SYSTOLIC BLOOD PRESSURE: 158 MMHG | HEIGHT: 72 IN | WEIGHT: 182 LBS

## 2020-01-14 DIAGNOSIS — N40.2 PROSTATE NODULE: ICD-10-CM

## 2020-01-14 DIAGNOSIS — R39.12 WEAK URINARY STREAM: ICD-10-CM

## 2020-01-14 DIAGNOSIS — N40.1 BENIGN PROSTATIC HYPERPLASIA WITH LOWER URINARY TRACT SYMPTOMS, SYMPTOM DETAILS UNSPECIFIED: Primary | ICD-10-CM

## 2020-01-14 DIAGNOSIS — R97.20 ELEVATED PSA: ICD-10-CM

## 2020-01-14 LAB
BACTERIA UR QL AUTO: ABNORMAL /HPF
BILIRUB UR QL STRIP: NEGATIVE
CLARITY UR: CLEAR
COLOR UR: YELLOW
GLUCOSE UR STRIP-MCNC: NEGATIVE MG/DL
HGB UR QL STRIP.AUTO: ABNORMAL
KETONES UR STRIP-MCNC: NEGATIVE MG/DL
LEUKOCYTE ESTERASE UR QL STRIP: NEGATIVE
NITRITE UR QL STRIP: POSITIVE
NON-SQ EPI CELLS URNS QL MICRO: ABNORMAL /HPF
PH UR STRIP.AUTO: 6.5 [PH]
PROT UR STRIP-MCNC: ABNORMAL MG/DL
RBC #/AREA URNS AUTO: ABNORMAL /HPF
SL AMB  POCT GLUCOSE, UA: NORMAL
SL AMB LEUKOCYTE ESTERASE,UA: NORMAL
SL AMB POCT BILIRUBIN,UA: NORMAL
SL AMB POCT BLOOD,UA: NORMAL
SL AMB POCT CLARITY,UA: CLEAR
SL AMB POCT COLOR,UA: YELLOW
SL AMB POCT KETONES,UA: NORMAL
SL AMB POCT NITRITE,UA: NORMAL
SL AMB POCT PH,UA: 6
SL AMB POCT SPECIFIC GRAVITY,UA: 1.01
SL AMB POCT URINE PROTEIN: NORMAL
SL AMB POCT UROBILINOGEN: 0.2
SP GR UR STRIP.AUTO: 1.02 (ref 1–1.03)
UROBILINOGEN UR QL STRIP.AUTO: 0.2 E.U./DL
WBC #/AREA URNS AUTO: ABNORMAL /HPF

## 2020-01-14 PROCEDURE — 99213 OFFICE O/P EST LOW 20 MIN: CPT | Performed by: UROLOGY

## 2020-01-14 PROCEDURE — 87086 URINE CULTURE/COLONY COUNT: CPT | Performed by: UROLOGY

## 2020-01-14 PROCEDURE — 81001 URINALYSIS AUTO W/SCOPE: CPT | Performed by: UROLOGY

## 2020-01-14 PROCEDURE — 87186 SC STD MICRODIL/AGAR DIL: CPT | Performed by: UROLOGY

## 2020-01-14 PROCEDURE — 81002 URINALYSIS NONAUTO W/O SCOPE: CPT | Performed by: UROLOGY

## 2020-01-14 NOTE — PROGRESS NOTES
UROLOGY ROUTINE FOLLOW UP NOTE     History of Present Illness:   Jessica Nelson is a 69 y o  male with a complaint of BPH/elevated PSA  Patient was previously under the care of a outside urologist   He is seen them for many years  His BPH has been managed with Flomax and Proscar  Patient had a recent cystourethroscopy in October of 2015  This demonstrated bilobar hypertrophy of the prostate with some trabeculation of the bladder  The patient has had urinary retention in the past     The patient has had markedly elevated PSAs  In 2016, the patient's PSA was as high as 16 2  Now on Proscar the patient's PSA is 9 12 measured in January of 2018  Patient has had 2 biopsies in 2012 and 2013  His prostate was measured at 66 g  He recently reports a urinary infection which was treated with antibiotics  He presents where it with his wife for discussion  The patient does have a hearing deficit  His daughter is a cardiologist with the [facility] system  AUA 14 QoL 1    Patient underwent a multiparametric MRI of his prostate  He has continued on PSA screening given his prior elevation  Fortunately there is low level concern for prostate cancer  Patient has stable urinary symptoms  No signs of fevers chills dysuria frequency or urgency different from prior  He remains on Flomax and Proscar  AUA SYMPTOM SCORE      Most Recent Value   AUA SYMPTOM SCORE   How often have you had a sensation of not emptying your bladder completely after you finished urinating? 4   How often have you had to urinate again less than two hours after you finished urinating? 2   How often have you found you stopped and started again several times when you urinate? 4   How often have you found it difficult to postpone urination? 1   How often have you had a weak urinary stream?  1   How often have you had to push or strain to begin urination?   0   How many times did you most typically get up to urinate from the time you went to bed at night until the time you got up in the morning? 2   Quality of Life: If you were to spend the rest of your life with your urinary condition just the way it is now, how would you feel about that?  2   AUA SYMPTOM SCORE  14        Past Medical History:     Past Medical History:   Diagnosis Date    BPH with obstruction/lower urinary tract symptoms     Elevated PSA     Hyperlipidemia        PAST SURGICAL HISTORY:     Past Surgical History:   Procedure Laterality Date    HERNIA REPAIR      NO PAST SURGERIES      [place] REPAIR ING HERNIA,5+Y/O,REDUCIBL Right 1/18/2018    Procedure: INGUINAL HERNIA REPAIR; VELÁSQUEZ CATHETER INSERTION;  Surgeon: Marva Stinson DO;  [facility];  Service: General       CURRENT MEDICATIONS:     Current Outpatient Medications   Medication Sig Dispense Refill    finasteride (PROSCAR) 5 mg tablet TAKE ONE TABLET DAILY 30 tablet 11    simvastatin (ZOCOR) 20 mg tablet Take 20 mg by mouth daily at bedtime      tamsulosin (FLOMAX) 0 4 mg TAKE ONE (1) CAPSULE DAILY WITH DINNER  30 capsule 11    oxyCODONE-acetaminophen (PERCOCET) 5-325 mg per tablet Take 2 tablets by mouth every 4 (four) hours as needed for moderate pain for up to 20 doses Max Daily Amount: 12 tablets (Patient not taking: Reported on 1/14/2020) 28 tablet 0     No current facility-administered medications for this visit          ALLERGIES:   No Known Allergies    SOCIAL HISTORY:     Social History     Socioeconomic History    Marital status: /Civil Union     Spouse name: None    Number of children: None    Years of education: None    Highest education level: None   Occupational History    Occupation:    retired   Social Needs    Financial resource strain: None    Food insecurity:     Worry: None     Inability: None    Transportation needs:     Medical: None     Non-medical: None   Tobacco Use    Smoking status: Never Smoker    Smokeless tobacco: Never Used   Substance and Sexual Activity    Alcohol use: Yes     Comment: occasionally    Drug use: No    Sexual activity: None   Lifestyle    Physical activity:     Days per week: None     Minutes per session: None    Stress: None   Relationships    Social connections:     Talks on phone: None     Gets together: None     Attends Jain service: None     Active member of club or organization: None     Attends meetings of clubs or organizations: None     Relationship status: None    Intimate partner violence:     Fear of current or ex partner: None     Emotionally abused: None     Physically abused: None     Forced sexual activity: None   Other Topics Concern    None   Social History Narrative    None       SOCIAL HISTORY:     Family History   Problem Relation Age of Onset    Colon cancer Mother        REVIEW OF SYSTEMS:   Review of Systems   Constitutional: Negative  Negative for chills and fever  Respiratory: Negative  Cardiovascular: Negative  Gastrointestinal: Negative  Genitourinary: Positive for difficulty urinating (Weak stream)  Negative for dysuria and hematuria  Neurological: Negative  Psychiatric/Behavioral: Negative  PHYSICAL EXAM:     /82 (BP Location: Left arm, Patient Position: Sitting, Cuff Size: Adult)   Pulse 100   Ht 6' (1 829 m)   Wt 82 6 kg (182 lb)   BMI 24 68 kg/m²   General:  Healthy appearing individual in no acute distress  They have a normal affect  There is not appear to be any gross neurologic defects or abnormalities  Hearing deficit  HEENT:  Normocephalic, atraumatic  Neck is supple without any palpable lymphadenopathy  Cardiovascular:  Patient has normal palpable distal radial pulses  There is no significant peripheral edema  No JVD is noted  Respiratory:  Patient has unlabored respirations  There is no audible wheeze or rhonchi  Abdomen:  Abdomen is without surgical scars  Abdomen is soft and nontender  There is no tympany    Inguinal and umbilical hernia are not appreciated  Genitourinary: no penile lesions or discharge, no testicular masses or tenderness, no hernias  YULIA with 1-1 5cm RIGHT base indurated nodule unchanged from prior digital rectal exam,   Patient's exam performed and in January of 2020 remains very stable from prior    Musculoskeletal:  Patient does not have significant CVA tenderness with palpation or percussion  They full range of motion in all 4 extremities  Strength in all 4 extremities appears congruent  Patient is able to ambulate without assistance or difficulty  Dermatologic:  Patient has no skin abnormalities or rashes  LABS:     CBC: No results found for: WBC, HGB, HCT, MCV, PLT    BMP:   Lab Results   Component Value Date    CALCIUM 9 7 04/05/2018    K 4 2 04/05/2018    CO2 30 04/05/2018     04/05/2018    BUN 16 04/05/2018    CREATININE 0 93 04/05/2018     Lab Results   Component Value Date    PSA 5 2 (H) 11/18/2019    PSA 5 5 (H) 11/01/2018     IMAGING:     MULTIPARAMETRIC MRI OF THE PROSTATE WITH AND WITHOUT CONTRAST      INDICATION: Previous biopsy, PSA of 16 2 in 2016, 9 12 in January      COMPARISON: None     PSA LEVEL: 9 12 ng/ml  ( )  PRIOR BIOPSY DATE: Biopsy 2012, 2013  BIOPSY RESULTS: Not applicable      TECHNIQUE: The following pulse sequences were obtained on a 1 5 T scanner:  T1w axial; T2w axial, sagittal and coronal; DWI axial and ADC map; T1w water, fat, in-phase and opposed-phase axials of entire pelvis and dynamic 3D T1w axial before and during   IV contrast injection      CONTRAST:  Gadobutrol (Gadavist) 15 ml      TECHNICAL LIMITATIONS: None         FINDINGS:     PROSTATE GLAND:  -VOLUME: 63 ml   -PERIPHERAL ZONE:  Is compressed by enlarged transition zone  -TRANSITION ZONE:  Enlargement of the transitional zone with presence of stromal nodules    -CENTRAL ZONE: Normal   -ANTERIOR FIBROMUSCULAR STROMA:    -"CAPSULE": Intact      SEMINAL VESICLES:       PELVIC CAVITY:  -LYMPH NODES: No lymphadenopathy  -BLADDER: Mild thickening of the urinary bladder is well seen  -ADDITIONAL FINDINGS: No other significant findings      OSSEOUS STRUCTURES:   Normal marrow signal  No evidence of bone metastases     Surgical changes are seen in the right groin from prior hernia repair     IMPRESSION:     There is no MRI evidence of  clinically significant cancer  Enlarged prostate gland with enlarged transition zone  Mild bladder wall thickening  PI-RADS v2 Assessment Category: PI-RADS 2: Low (clinically significant cancer is unlikely to be present)      Routine surveillance can be performed    PATHOLOGY:     Reported ASAP on biopsy in 2012; negative biopsy in 2013    ASSESSMENT:     69 y o  male with elevated PSA on Proscar continuing to improve, prostate nodule, and 2 prior negative biopsies, now with negative mpMRI    PLAN:     The patient is stable from a urinary perspective in terms of his symptoms  He will continue on the flomax/proscar regimen    Patient will continue to prefer followup of PSA value as opposed to any further aggressive biopsy  We are reassured by PSA trend and mpMRI  Patient's prostate nodule remains very stable from prior exams  Given the stability of his PSA, recommend continued follow-up  F/U one year  Consider additional PSA testing until age 77

## 2020-01-16 LAB — BACTERIA UR CULT: ABNORMAL

## 2020-11-27 DIAGNOSIS — R39.12 WEAK URINARY STREAM: ICD-10-CM

## 2020-11-27 RX ORDER — TAMSULOSIN HYDROCHLORIDE 0.4 MG/1
CAPSULE ORAL
Qty: 30 CAPSULE | Refills: 11 | Status: SHIPPED | OUTPATIENT
Start: 2020-11-27 | End: 2021-11-05

## 2020-11-27 RX ORDER — FINASTERIDE 5 MG/1
TABLET, FILM COATED ORAL
Qty: 30 TABLET | Refills: 11 | Status: SHIPPED | OUTPATIENT
Start: 2020-11-27 | End: 2021-11-05

## 2021-01-22 ENCOUNTER — TELEPHONE (OUTPATIENT)
Dept: UROLOGY | Facility: CLINIC | Age: 76
End: 2021-01-22

## 2021-01-22 ENCOUNTER — LAB (OUTPATIENT)
Dept: LAB | Facility: HOSPITAL | Age: 76
End: 2021-01-22
Attending: UROLOGY
Payer: MEDICARE

## 2021-01-22 DIAGNOSIS — R97.20 ELEVATED PSA: Primary | ICD-10-CM

## 2021-01-22 DIAGNOSIS — R97.20 ELEVATED PSA: ICD-10-CM

## 2021-01-22 LAB — PSA SERPL-MCNC: 5 NG/ML (ref 0–4)

## 2021-01-22 PROCEDURE — 84153 ASSAY OF PSA TOTAL: CPT

## 2021-01-22 NOTE — TELEPHONE ENCOUNTER
Spoke with Nenita(pt's wife), advised that provider is unavailable on 2/5/21 at [address] (Dr Carmita Zamarripa), and I need to reschedule the pt's appointment with [facility], or next available with Dr Carmita Zamarripa (April 2021)  Pt's wife ok'd appointment with GEOVANNY Ozuna), but she will callback if pt disagrees and prefers to reschedule with Dr Carmita Zamarripa in [address] next available  Mailing appt card to pt

## 2021-01-25 NOTE — TELEPHONE ENCOUNTER
Patients wife called back and said her  wants to wait to see the doctor  I did change the schedule and he is seeing the doctor end of April  She wants to know if he needs his PSA again  He had it on 1/22/21  If he needs to have it again then please call her back at [phone] and if she said he does not need it again then she does not need a call back

## 2021-04-16 ENCOUNTER — TELEPHONE (OUTPATIENT)
Dept: UROLOGY | Facility: CLINIC | Age: 76
End: 2021-04-16

## 2021-04-16 NOTE — TELEPHONE ENCOUNTER
Lvm notifying pt that the appt with Dr Lucia Cabral at [facility] on 4/23/21 has been cx'd due to provider unavailable for an urgent matter  Notified pt per ZP that ok to schedule with [facility]  I TENTATIVELY scheduled pt with Richard Zapata on 5/3/21 at [facility]  Waiting for pt to confirm appt

## 2021-06-03 ENCOUNTER — OFFICE VISIT (OUTPATIENT)
Dept: UROLOGY | Facility: CLINIC | Age: 76
End: 2021-06-03
Payer: MEDICARE

## 2021-06-03 VITALS
HEIGHT: 71 IN | DIASTOLIC BLOOD PRESSURE: 80 MMHG | BODY MASS INDEX: 25.06 KG/M2 | SYSTOLIC BLOOD PRESSURE: 140 MMHG | WEIGHT: 179 LBS

## 2021-06-03 DIAGNOSIS — N40.1 BENIGN PROSTATIC HYPERPLASIA WITH LOWER URINARY TRACT SYMPTOMS, SYMPTOM DETAILS UNSPECIFIED: ICD-10-CM

## 2021-06-03 DIAGNOSIS — N40.2 PROSTATE NODULE: Primary | ICD-10-CM

## 2021-06-03 DIAGNOSIS — Z12.5 SCREENING FOR PROSTATE CANCER: ICD-10-CM

## 2021-06-03 PROCEDURE — 99213 OFFICE O/P EST LOW 20 MIN: CPT | Performed by: UROLOGY

## 2021-06-03 NOTE — PROGRESS NOTES
UROLOGY ROUTINE FOLLOW UP NOTE     History of Present Illness:   Marie Fontenot is a 77 y o  male with a complaint of BPH/elevated PSA  Patient was previously under the care of a outside urologist   He is seen them for many years  His BPH has been managed with Flomax and Proscar  Patient had a recent cystourethroscopy in October of 2015  This demonstrated bilobar hypertrophy of the prostate with some trabeculation of the bladder  The patient has had urinary retention in the past     The patient has had markedly elevated PSAs  In 2016, the patient's PSA was as high as 16 2  Now on Proscar the patient's PSA is 9 12 measured in January of 2018  Patient has had 2 biopsies in 2012 and 2013  His prostate was measured at 66 g  The patient does have a hearing deficit  His daughter is a cardiologist with the [employer] system  Patient underwent a multiparametric MRI of his prostate  He has continued on PSA screening given his prior elevation  Fortunately there is low level concern for prostate cancer  Patient has stable urinary symptoms  No signs of fevers chills dysuria frequency or urgency different from prior  He remains on Flomax and Proscar  No new issues      Past Medical History:     Past Medical History:   Diagnosis Date    BPH with obstruction/lower urinary tract symptoms     Elevated PSA     Hyperlipidemia        PAST SURGICAL HISTORY:     Past Surgical History:   Procedure Laterality Date    HERNIA REPAIR      NO PAST SURGERIES      MA REPAIR ING HERNIA,5+Y/O,REDUCIBL Right 1/18/2018    Procedure: INGUINAL HERNIA REPAIR; VELÁSQUEZ CATHETER INSERTION;  Surgeon: Jamir Chavarria DO;  Location: [facility];  Service: General       CURRENT MEDICATIONS:     Current Outpatient Medications   Medication Sig Dispense Refill    finasteride (PROSCAR) 5 mg tablet TAKE ONE TABLET DAILY 30 tablet 11    simvastatin (ZOCOR) 20 mg tablet Take 20 mg by mouth daily at bedtime      tamsulosin (FLOMAX) 0 4 mg TAKE ONE (1) CAPSULE DAILY WITH DINNER  30 capsule 11    oxyCODONE-acetaminophen (PERCOCET) 5-325 mg per tablet Take 2 tablets by mouth every 4 (four) hours as needed for moderate pain for up to 20 doses Max Daily Amount: 12 tablets (Patient not taking: Reported on 1/14/2020) 28 tablet 0     No current facility-administered medications for this visit  ALLERGIES:   No Known Allergies    SOCIAL HISTORY:     Social History     Socioeconomic History    Marital status: /Civil Union     Spouse name: None    Number of children: None    Years of education: None    Highest education level: None   Occupational History    Occupation:    retired   Social Needs    Financial resource strain: None    Food insecurity     Worry: None     Inability: None    Transportation needs     Medical: None     Non-medical: None   Tobacco Use    Smoking status: Never Smoker    Smokeless tobacco: Never Used   Substance and Sexual Activity    Alcohol use: Yes     Comment: occasionally    Drug use: No    Sexual activity: None   Lifestyle    Physical activity     Days per week: None     Minutes per session: None    Stress: None   Relationships    Social connections     Talks on phone: None     Gets together: None     Attends Anabaptist service: None     Active member of club or organization: None     Attends meetings of clubs or organizations: None     Relationship status: None    Intimate partner violence     Fear of current or ex partner: None     Emotionally abused: None     Physically abused: None     Forced sexual activity: None   Other Topics Concern    None   Social History Narrative    None       SOCIAL HISTORY:     Family History   Problem Relation Age of Onset    Colon cancer Mother        REVIEW OF SYSTEMS:     Review of Systems   Constitutional: Negative  HENT: Positive for hearing loss  Respiratory: Negative  Cardiovascular: Negative  Gastrointestinal: Negative  Genitourinary: Negative  Musculoskeletal: Positive for gait problem  Skin: Negative  Psychiatric/Behavioral: Negative  PHYSICAL EXAM:     /80   Ht 5' 11" (1 803 m)   Wt 81 2 kg (179 lb)   BMI 24 97 kg/m²   General:  Healthy appearing individual in no acute distress  They have a normal affect  There is not appear to be any gross neurologic defects or abnormalities  Hearing deficit  HEENT:  Normocephalic, atraumatic  Neck is supple without any palpable lymphadenopathy  Cardiovascular:  Patient has normal palpable distal radial pulses  There is no significant peripheral edema  No JVD is noted  Respiratory:  Patient has unlabored respirations  There is no audible wheeze or rhonchi  Abdomen:  Abdomen is without surgical scars  Abdomen is soft and nontender  There is no tympany  Inguinal and umbilical hernia are not appreciated  Genitourinary: no penile lesions or discharge, no testicular masses or tenderness, no hernias  YULIA with 1-1 5cm RIGHT base indurated nodule unchanged from prior digital rectal exam,   Patient's exam performed  In the area of hypertrophy and slight induration remains stable from all prior exams    Musculoskeletal:  Patient does not have significant CVA tenderness with palpation or percussion  They full range of motion in all 4 extremities  Strength in all 4 extremities appears congruent  Patient is able to ambulate without assistance or difficulty  Dermatologic:  Patient has no skin abnormalities or rashes       LABS:     CBC: No results found for: WBC, HGB, HCT, MCV, PLT    BMP:   Lab Results   Component Value Date    CALCIUM 9 7 04/05/2018    K 4 2 04/05/2018    CO2 30 04/05/2018     04/05/2018    BUN 16 04/05/2018    CREATININE 0 93 04/05/2018     Lab Results   Component Value Date    PSA 5 0 (H) 01/22/2021    PSA 5 2 (H) 11/18/2019    PSA 5 5 (H) 11/01/2018     IMAGING:     MULTIPARAMETRIC MRI OF THE PROSTATE WITH AND WITHOUT CONTRAST    INDICATION: Previous biopsy, PSA of 16 2 in 2016, 9 12 in January      COMPARISON: None     PSA LEVEL: 9 12 ng/ml  ( )  PRIOR BIOPSY DATE: Biopsy 2012, 2013  BIOPSY RESULTS: Not applicable      TECHNIQUE: The following pulse sequences were obtained on a 1 5 T scanner:  T1w axial; T2w axial, sagittal and coronal; DWI axial and ADC map; T1w water, fat, in-phase and opposed-phase axials of entire pelvis and dynamic 3D T1w axial before and during   IV contrast injection      CONTRAST:  Gadobutrol (Gadavist) 15 ml      TECHNICAL LIMITATIONS: None         FINDINGS:     PROSTATE GLAND:  -VOLUME: 63 ml   -PERIPHERAL ZONE:  Is compressed by enlarged transition zone  -TRANSITION ZONE:  Enlargement of the transitional zone with presence of stromal nodules  -CENTRAL ZONE: Normal   -ANTERIOR FIBROMUSCULAR STROMA:    -"CAPSULE": Intact      SEMINAL VESICLES:       PELVIC CAVITY:  -LYMPH NODES: No lymphadenopathy  -BLADDER: Mild thickening of the urinary bladder is well seen  -ADDITIONAL FINDINGS: No other significant findings      OSSEOUS STRUCTURES:   Normal marrow signal  No evidence of bone metastases     Surgical changes are seen in the right groin from prior hernia repair     IMPRESSION:     There is no MRI evidence of  clinically significant cancer  Enlarged prostate gland with enlarged transition zone  Mild bladder wall thickening  PI-RADS v2 Assessment Category: PI-RADS 2: Low (clinically significant cancer is unlikely to be present)      Routine surveillance can be performed    PATHOLOGY:     Reported ASAP on biopsy in 2012; negative biopsy in 2013    ASSESSMENT:     77 y o  male with elevated PSA on Proscar continuing to improve, prostate nodule, and 2 prior negative biopsies, now with negative mpMRI    PLAN:     The patient is stable from a urinary perspective in terms of his symptoms  He will continue on the flomax/proscar regimen    We are reassured by PSA trend and prior mpMRI   Patient's prostate nodule remains very stable from prior exams  F/U one year  Consider additional PSA testing until age 77  If next year, the PSA continues to remain stable in the exam is stable, will consider cessation of additional testing

## 2021-11-05 DIAGNOSIS — R39.12 WEAK URINARY STREAM: ICD-10-CM

## 2021-11-05 RX ORDER — TAMSULOSIN HYDROCHLORIDE 0.4 MG/1
CAPSULE ORAL
Qty: 30 CAPSULE | Refills: 11 | Status: SHIPPED | OUTPATIENT
Start: 2021-11-05 | End: 2022-06-30 | Stop reason: SDUPTHER

## 2021-11-05 RX ORDER — FINASTERIDE 5 MG/1
TABLET, FILM COATED ORAL
Qty: 30 TABLET | Refills: 11 | Status: SHIPPED | OUTPATIENT
Start: 2021-11-05 | End: 2022-06-30 | Stop reason: SDUPTHER

## 2022-02-14 ENCOUNTER — TELEPHONE (OUTPATIENT)
Dept: UROLOGY | Facility: AMBULATORY SURGERY CENTER | Age: 77
End: 2022-02-14

## 2022-02-14 NOTE — TELEPHONE ENCOUNTER
Patient last seen June 2021  Per Dr Ute Tavares follow up plan, patient should be scheduled with him only for annual appointment  Please schedule patient for annual appointment in June 2022 with Dr Mady Chase, should have his PSA completed prior  Order remains in chart

## 2022-02-14 NOTE — TELEPHONE ENCOUNTER
Patient managed by Lucy Santizo calling for yearly appointment  Wife said this should be his last appointment and is requesting to see MD not AP  Please assist with scheduling

## 2022-02-15 NOTE — TELEPHONE ENCOUNTER
Spoke with patient wife, scheduled with Dr Lucy Santizo at [facility] on 6/24/22 at 7:45 am  Patient to have psa bloodwork prior to appointment

## 2022-06-02 ENCOUNTER — TELEPHONE (OUTPATIENT)
Dept: UROLOGY | Facility: CLINIC | Age: 77
End: 2022-06-02

## 2022-06-02 NOTE — TELEPHONE ENCOUNTER
Sent letter of appointment date and time change doctor Sarwat Albarran will not be in the office 6/27/22

## 2022-06-13 ENCOUNTER — APPOINTMENT (OUTPATIENT)
Dept: LAB | Facility: HOSPITAL | Age: 77
End: 2022-06-13
Attending: UROLOGY
Payer: MEDICARE

## 2022-06-13 DIAGNOSIS — R97.20 ELEVATED PSA: ICD-10-CM

## 2022-06-13 DIAGNOSIS — R97.20 ELEVATED PSA: Primary | ICD-10-CM

## 2022-06-13 LAB — PSA SERPL-MCNC: 4 NG/ML (ref 0–4)

## 2022-06-13 PROCEDURE — 84153 ASSAY OF PSA TOTAL: CPT

## 2022-06-30 ENCOUNTER — OFFICE VISIT (OUTPATIENT)
Dept: UROLOGY | Facility: CLINIC | Age: 77
End: 2022-06-30
Payer: MEDICARE

## 2022-06-30 VITALS
SYSTOLIC BLOOD PRESSURE: 130 MMHG | HEART RATE: 64 BPM | BODY MASS INDEX: 24.83 KG/M2 | WEIGHT: 178 LBS | DIASTOLIC BLOOD PRESSURE: 72 MMHG

## 2022-06-30 DIAGNOSIS — R39.12 WEAK URINARY STREAM: ICD-10-CM

## 2022-06-30 PROCEDURE — 99213 OFFICE O/P EST LOW 20 MIN: CPT | Performed by: UROLOGY

## 2022-06-30 RX ORDER — LISINOPRIL 10 MG/1
TABLET ORAL
COMMUNITY
Start: 2022-04-11

## 2022-06-30 RX ORDER — METOPROLOL SUCCINATE 25 MG/1
TABLET, EXTENDED RELEASE ORAL
COMMUNITY
Start: 2022-04-28

## 2022-06-30 RX ORDER — TAMSULOSIN HYDROCHLORIDE 0.4 MG/1
0.4 CAPSULE ORAL
Qty: 90 CAPSULE | Refills: 3 | Status: SHIPPED | OUTPATIENT
Start: 2022-06-30

## 2022-06-30 RX ORDER — FINASTERIDE 5 MG/1
5 TABLET, FILM COATED ORAL DAILY
Qty: 90 TABLET | Refills: 3 | Status: SHIPPED | OUTPATIENT
Start: 2022-06-30

## 2022-06-30 NOTE — PROGRESS NOTES
UROLOGY ROUTINE FOLLOW UP NOTE     History of Present Illness:   Carie Parent is a 77 y o  male with a complaint of BPH/elevated PSA  Patient was previously under the care of a outside urologist   He is seen them for many years  His BPH has been managed with Flomax and Proscar  Patient had a recent cystourethroscopy in October of 2015  This demonstrated bilobar hypertrophy of the prostate with some trabeculation of the bladder  The patient has had urinary retention in the past     The patient has had markedly elevated PSAs  In 2016, the patient's PSA was as high as 16 2  Now on Proscar the patient's PSA is 9 12 measured in January of 2018  Patient has had 2 biopsies in 2012 and 2013  His prostate was measured at 66 g  The patient does have a hearing deficit  His daughter is a cardiologist with the [employer] [facility] system  Patient underwent a multiparametric MRI of his prostate  He remains on Flomax and Proscar  Hearing loss has progressed  He is no longer able to hear his wife and his resorted to reading lips  This has been progressive over the last several years  His wife has started to notice some changes to his balance  PSA is even lower and urinary symptoms are stable      Lab Results   Component Value Date    PSA 4 0 06/13/2022    PSA 5 0 (H) 01/22/2021    PSA 5 2 (H) 11/18/2019     Past Medical History:     Past Medical History:   Diagnosis Date    BPH with obstruction/lower urinary tract symptoms     Elevated PSA     Hyperlipidemia        PAST SURGICAL HISTORY:     Past Surgical History:   Procedure Laterality Date    HERNIA REPAIR      NO PAST SURGERIES      HI REPAIR ING HERNIA,5+Y/O,REDUCIBL Right 1/18/2018    Procedure: INGUINAL HERNIA REPAIR; VELÁSQUEZ CATHETER INSERTION;  Surgeon: Haris Khoury DO;  [facility];  Service: General       CURRENT MEDICATIONS:     Current Outpatient Medications   Medication Sig Dispense Refill    finasteride (PROSCAR) 5 mg tablet TAKE ONE TABLET DAILY 30 tablet 11    lisinopril (ZESTRIL) 10 mg tablet       metoprolol succinate (TOPROL-XL) 25 mg 24 hr tablet       simvastatin (ZOCOR) 20 mg tablet Take 20 mg by mouth daily at bedtime      tamsulosin (FLOMAX) 0 4 mg TAKE ONE (1) CAPSULE DAILY WITH DINNER  30 capsule 11    oxyCODONE-acetaminophen (PERCOCET) 5-325 mg per tablet Take 2 tablets by mouth every 4 (four) hours as needed for moderate pain for up to 20 doses Max Daily Amount: 12 tablets (Patient not taking: No sig reported) 28 tablet 0     No current facility-administered medications for this visit  ALLERGIES:   No Known Allergies    SOCIAL HISTORY:     Social History     Socioeconomic History    Marital status: /Civil Union     Spouse name: None    Number of children: None    Years of education: None    Highest education level: None   Occupational History    Occupation:    retired   Tobacco Use    Smoking status: Never Smoker    Smokeless tobacco: Never Used   Vaping Use    Vaping Use: Never used   Substance and Sexual Activity    Alcohol use: Yes     Comment: occasionally    Drug use: No    Sexual activity: None   Other Topics Concern    None   Social History Narrative    None     Social Determinants of Health     Financial Resource Strain: Not on file   Food Insecurity: Not on file   Transportation Needs: Not on file   Physical Activity: Not on file   Stress: Not on file   Social Connections: Not on file   Intimate Partner Violence: Not on file   Housing Stability: Not on file       SOCIAL HISTORY:     Family History   Problem Relation Age of Onset    Colon cancer Mother        REVIEW OF SYSTEMS:     Review of Systems   Constitutional: Negative  HENT: Positive for hearing loss  Respiratory: Negative  Cardiovascular: Negative  Gastrointestinal: Negative  Genitourinary: Negative  Musculoskeletal: Positive for gait problem  Skin: Negative  Psychiatric/Behavioral: Negative  PHYSICAL EXAM:     /72 (BP Location: Left arm, Patient Position: Sitting, Cuff Size: Adult)   Pulse 64   Wt 80 7 kg (178 lb)   BMI 24 83 kg/m²   General:  Healthy appearing individual in no acute distress  They have a normal affect  There is not appear to be any gross neurologic defects or abnormalities  Hearing deficit  HEENT:  Normocephalic, atraumatic  Neck is supple without any palpable lymphadenopathy  Cardiovascular:  Patient has normal palpable distal radial pulses  There is no significant peripheral edema  No JVD is noted  Respiratory:  Patient has unlabored respirations  There is no audible wheeze or rhonchi  Abdomen:  Abdomen is without surgical scars  Abdomen is soft and nontender  There is no tympany  Inguinal and umbilical hernia are not appreciated  Genitourinary: no penile lesions or discharge, no testicular masses or tenderness, no hernias  YULIA performed, there is actually less induration and nodularity on the right side of the prostate as compared to prior    Musculoskeletal:  Patient does not have significant CVA tenderness with palpation or percussion  They full range of motion in all 4 extremities  Strength in all 4 extremities appears congruent  Patient is able to ambulate without assistance or difficulty  Dermatologic:  Patient has no skin abnormalities or rashes  LABS:     CBC: No results found for: WBC, HGB, HCT, MCV, PLT    BMP:   Lab Results   Component Value Date    CALCIUM 9 7 2018    K 4 2 2018    CO2 30 2018     2018    BUN 16 2018    CREATININE 0 93 2018     Lab Results   Component Value Date    PSA 4 0 2022    PSA 5 0 (H) 2021    PSA 5 2 (H) 2019     IMAGIN/9/2018  MULTIPARAMETRIC MRI OF THE PROSTATE WITH AND WITHOUT CONTRAST      INDICATION: Previous biopsy, PSA of 16 2 in , 9 12 in January      COMPARISON: None     PSA LEVEL: 9 12 ng/ml  ( )    PRIOR BIOPSY DATE: Biopsy , 2013   BIOPSY RESULTS: Not applicable      TECHNIQUE: The following pulse sequences were obtained on a 1 5 T scanner:  T1w axial; T2w axial, sagittal and coronal; DWI axial and ADC map; T1w water, fat, in-phase and opposed-phase axials of entire pelvis and dynamic 3D T1w axial before and during   IV contrast injection      CONTRAST:  Gadobutrol (Gadavist) 15 ml      TECHNICAL LIMITATIONS: None         FINDINGS:     PROSTATE GLAND:  -VOLUME: 63 ml   -PERIPHERAL ZONE:  Is compressed by enlarged transition zone  -TRANSITION ZONE:  Enlargement of the transitional zone with presence of stromal nodules  -CENTRAL ZONE: Normal   -ANTERIOR FIBROMUSCULAR STROMA:    -"CAPSULE": Intact      SEMINAL VESICLES:       PELVIC CAVITY:  -LYMPH NODES: No lymphadenopathy  -BLADDER: Mild thickening of the urinary bladder is well seen  -ADDITIONAL FINDINGS: No other significant findings      OSSEOUS STRUCTURES:   Normal marrow signal  No evidence of bone metastases     Surgical changes are seen in the right groin from prior hernia repair     IMPRESSION:     There is no MRI evidence of  clinically significant cancer  Enlarged prostate gland with enlarged transition zone  Mild bladder wall thickening  PI-RADS v2 Assessment Category: PI-RADS 2: Low (clinically significant cancer is unlikely to be present)      Routine surveillance can be performed    PATHOLOGY:     Reported ASAP on biopsy in 2012; negative biopsy in 2013    ASSESSMENT:     77 y o  male with elevated PSA on Proscar continuing to improve, prostate nodule, and 2 prior negative biopsies, now with negative mpMRI    PLAN:     Patient has had appropriate reduction in his PSA on finasteride  His MRIs are less concerning  Urinary symptoms are stable  Given his age of 77, I think it is appropriate for us to cease further prostate cancer screening  Patient no longer requires rectal exams or PSAs unless he develops new or concerning urinary symptoms    He can be maintained on his Proscar and Flomax which can be refilled by his primary care team moving forward  I did carefully counseled the patient and his wife that if his balance issues progress restart developed dizziness or concern for falls, the Flomax should be stopped but the finasteride could be continued  He will return to see Urology on an as-needed basis moving forward  It has been an absolute pleasure to take care of this patient and his family

## 2022-06-30 NOTE — LETTER
June 30, 2022     [place] [id number]    Patient: Jerry Cline   YOB: 1945   Date of Visit: 6/30/2022       Dear Dr Lit Uribe: Thank you for referring Jerry Cline to me for evaluation  Below are my notes for this consultation  If you have questions, please do not hesitate to call me  I look forward to following your patient along with you  Sincerely,        Mady Wellington MD        CC: NIRMAL Reyes MD  6/30/2022 10:40 AM  Sign when Signing Visit    UROLOGY ROUTINE FOLLOW UP NOTE     History of Present Illness:   Jerry Cline is a 77 y o  male with a complaint of BPH/elevated PSA  Patient was previously under the care of a outside urologist   He is seen them for many years  His BPH has been managed with Flomax and Proscar  Patient had a recent cystourethroscopy in October of 2015  This demonstrated bilobar hypertrophy of the prostate with some trabeculation of the bladder  The patient has had urinary retention in the past     The patient has had markedly elevated PSAs  In 2016, the patient's PSA was as high as 16 2  Now on Proscar the patient's PSA is 9 12 measured in January of 2018  Patient has had 2 biopsies in 2012 and 2013  His prostate was measured at 66 g  The patient does have a hearing deficit  His daughter is a cardiologist with the [facility] system  Patient underwent a multiparametric MRI of his prostate  He remains on Flomax and Proscar  Hearing loss has progressed  He is no longer able to hear his wife and his resorted to reading lips  This has been progressive over the last several years  His wife has started to notice some changes to his balance  PSA is even lower and urinary symptoms are stable      Lab Results   Component Value Date    PSA 4 0 06/13/2022    PSA 5 0 (H) 01/22/2021    PSA 5 2 (H) 11/18/2019     Past Medical History:     Past Medical History:   Diagnosis Date    BPH with obstruction/lower urinary tract symptoms     Elevated PSA     Hyperlipidemia        PAST SURGICAL HISTORY:     Past Surgical History:   Procedure Laterality Date    HERNIA REPAIR      NO PAST SURGERIES      [place] REPAIR ING HERNIA,5+Y/O,REDUCIBL Right 1/18/2018    Procedure: INGUINAL HERNIA REPAIR; VELÁSQUEZ CATHETER INSERTION;  Surgeon: Al Erazo DO;  Location: [facility];  Service: General       CURRENT MEDICATIONS:     Current Outpatient Medications   Medication Sig Dispense Refill    finasteride (PROSCAR) 5 mg tablet TAKE ONE TABLET DAILY 30 tablet 11    lisinopril (ZESTRIL) 10 mg tablet       metoprolol succinate (TOPROL-XL) 25 mg 24 hr tablet       simvastatin (ZOCOR) 20 mg tablet Take 20 mg by mouth daily at bedtime      tamsulosin (FLOMAX) 0 4 mg TAKE ONE (1) CAPSULE DAILY WITH DINNER  30 capsule 11    oxyCODONE-acetaminophen (PERCOCET) 5-325 mg per tablet Take 2 tablets by mouth every 4 (four) hours as needed for moderate pain for up to 20 doses Max Daily Amount: 12 tablets (Patient not taking: No sig reported) 28 tablet 0     No current facility-administered medications for this visit         ALLERGIES:   No Known Allergies    SOCIAL HISTORY:     Social History     Socioeconomic History    Marital status: /Civil Union     Spouse name: None    Number of children: None    Years of education: None    Highest education level: None   Occupational History    Occupation:    retired   Tobacco Use    Smoking status: Never Smoker    Smokeless tobacco: Never Used   Vaping Use    Vaping Use: Never used   Substance and Sexual Activity    Alcohol use: Yes     Comment: occasionally    Drug use: No    Sexual activity: None   Other Topics Concern    None   Social History Narrative    None     Social Determinants of Health     Financial Resource Strain: Not on file   Food Insecurity: Not on file   Transportation Needs: Not on file   Physical Activity: Not on file Stress: Not on file   Social Connections: Not on file   Intimate Partner Violence: Not on file   Housing Stability: Not on file       SOCIAL HISTORY:     Family History   Problem Relation Age of Onset    Colon cancer Mother        REVIEW OF SYSTEMS:     Review of Systems   Constitutional: Negative  HENT: Positive for hearing loss  Respiratory: Negative  Cardiovascular: Negative  Gastrointestinal: Negative  Genitourinary: Negative  Musculoskeletal: Positive for gait problem  Skin: Negative  Psychiatric/Behavioral: Negative  PHYSICAL EXAM:     /72 (BP Location: Left arm, Patient Position: Sitting, Cuff Size: Adult)   Pulse 64   Wt 80 7 kg (178 lb)   BMI 24 83 kg/m²   General:  Healthy appearing individual in no acute distress  They have a normal affect  There is not appear to be any gross neurologic defects or abnormalities  Hearing deficit  HEENT:  Normocephalic, atraumatic  Neck is supple without any palpable lymphadenopathy  Cardiovascular:  Patient has normal palpable distal radial pulses  There is no significant peripheral edema  No JVD is noted  Respiratory:  Patient has unlabored respirations  There is no audible wheeze or rhonchi  Abdomen:  Abdomen is without surgical scars  Abdomen is soft and nontender  There is no tympany  Inguinal and umbilical hernia are not appreciated  Genitourinary: no penile lesions or discharge, no testicular masses or tenderness, no hernias  YULIA performed, there is actually less induration and nodularity on the right side of the prostate as compared to prior    Musculoskeletal:  Patient does not have significant CVA tenderness with palpation or percussion  They full range of motion in all 4 extremities  Strength in all 4 extremities appears congruent  Patient is able to ambulate without assistance or difficulty  Dermatologic:  Patient has no skin abnormalities or rashes       LABS:     CBC: No results found for: WBC, HGB, HCT, MCV, PLT    BMP:   Lab Results   Component Value Date    CALCIUM 9 7 2018    K 4 2 2018    CO2 30 2018     2018    BUN 16 2018    CREATININE 0 93 2018     Lab Results   Component Value Date    PSA 4 0 2022    PSA 5 0 (H) 2021    PSA 5 2 (H) 2019     IMAGIN/9/2018  MULTIPARAMETRIC MRI OF THE PROSTATE WITH AND WITHOUT CONTRAST      INDICATION: Previous biopsy, PSA of 16 2 in , 9 12 in January      COMPARISON: None     PSA LEVEL: 9 12 ng/ml  ( )  PRIOR BIOPSY DATE: Biopsy ,   BIOPSY RESULTS: Not applicable      TECHNIQUE: The following pulse sequences were obtained on a 1 5 T scanner:  T1w axial; T2w axial, sagittal and coronal; DWI axial and ADC map; T1w water, fat, in-phase and opposed-phase axials of entire pelvis and dynamic 3D T1w axial before and during   IV contrast injection      CONTRAST:  Gadobutrol (Gadavist) 15 ml      TECHNICAL LIMITATIONS: None         FINDINGS:     PROSTATE GLAND:  -VOLUME: 63 ml   -PERIPHERAL ZONE:  Is compressed by enlarged transition zone  -TRANSITION ZONE:  Enlargement of the transitional zone with presence of stromal nodules  -CENTRAL ZONE: Normal   -ANTERIOR FIBROMUSCULAR STROMA:    -"CAPSULE": Intact      SEMINAL VESICLES:       PELVIC CAVITY:  -LYMPH NODES: No lymphadenopathy  -BLADDER: Mild thickening of the urinary bladder is well seen  -ADDITIONAL FINDINGS: No other significant findings      OSSEOUS STRUCTURES:   Normal marrow signal  No evidence of bone metastases     Surgical changes are seen in the right groin from prior hernia repair     IMPRESSION:     There is no MRI evidence of  clinically significant cancer  Enlarged prostate gland with enlarged transition zone    Mild bladder wall thickening  PI-RADS v2 Assessment Category: PI-RADS 2: Low (clinically significant cancer is unlikely to be present)      Routine surveillance can be performed    PATHOLOGY:     Reported ASAP on biopsy in 2012; negative biopsy in 2013    ASSESSMENT:     77 y o  male with elevated PSA on Proscar continuing to improve, prostate nodule, and 2 prior negative biopsies, now with negative mpMRI    PLAN:     Patient has had appropriate reduction in his PSA on finasteride  His MRIs are less concerning  Urinary symptoms are stable  Given his age of 77, I think it is appropriate for us to cease further prostate cancer screening  Patient no longer requires rectal exams or PSAs unless he develops new or concerning urinary symptoms  He can be maintained on his Proscar and Flomax which can be refilled by his primary care team moving forward  I did carefully counseled the patient and his wife that if his balance issues progress restart developed dizziness or concern for falls, the Flomax should be stopped but the finasteride could be continued  He will return to see Urology on an as-needed basis moving forward  It has been an absolute pleasure to take care of this patient and his family

## (undated) DEVICE — VIAL DECANTER

## (undated) DEVICE — CHLORAPREP HI-LITE 26ML ORANGE

## (undated) DEVICE — GLOVE SRG BIOGEL ORTHOPEDIC 8

## (undated) DEVICE — STRL UNIVERSAL MINOR GENERAL: Brand: CARDINAL HEALTH

## (undated) DEVICE — SUT MONOCRYL 4-0 PS-2 27 IN Y426H

## (undated) DEVICE — LIGHT HANDLE COVER SLEEVE DISP BLUE STELLAR

## (undated) DEVICE — SUT VICRYL 2-0 SH 27 IN UNDYED J417H

## (undated) DEVICE — PLUMEPEN PRO 10FT

## (undated) DEVICE — REM POLYHESIVE ADULT PATIENT RETURN ELECTRODE: Brand: VALLEYLAB

## (undated) DEVICE — SCD SEQUENTIAL COMPRESSION COMFORT SLEEVE MEDIUM KNEE LENGTH: Brand: KENDALL SCD

## (undated) DEVICE — INTENDED FOR TISSUE SEPARATION, AND OTHER PROCEDURES THAT REQUIRE A SHARP SURGICAL BLADE TO PUNCTURE OR CUT.: Brand: BARD-PARKER SAFETY BLADES SIZE 15, STERILE

## (undated) DEVICE — TOWEL SET X-RAY

## (undated) DEVICE — ADHESIVE SKN CLSR HISTOACRYL FLEX 0.5ML LF

## (undated) DEVICE — NEEDLE 22 G X 1 1/2 SAFETY